# Patient Record
Sex: FEMALE | Race: WHITE | NOT HISPANIC OR LATINO | Employment: UNEMPLOYED | URBAN - METROPOLITAN AREA
[De-identification: names, ages, dates, MRNs, and addresses within clinical notes are randomized per-mention and may not be internally consistent; named-entity substitution may affect disease eponyms.]

---

## 2017-04-28 ENCOUNTER — GENERIC CONVERSION - ENCOUNTER (OUTPATIENT)
Dept: OTHER | Facility: OTHER | Age: 47
End: 2017-04-28

## 2017-05-31 ENCOUNTER — GENERIC CONVERSION - ENCOUNTER (OUTPATIENT)
Dept: OTHER | Facility: OTHER | Age: 47
End: 2017-05-31

## 2017-08-17 ENCOUNTER — ALLSCRIPTS OFFICE VISIT (OUTPATIENT)
Dept: OTHER | Facility: OTHER | Age: 47
End: 2017-08-17

## 2017-08-21 ENCOUNTER — GENERIC CONVERSION - ENCOUNTER (OUTPATIENT)
Dept: OTHER | Facility: OTHER | Age: 47
End: 2017-08-21

## 2017-10-04 ENCOUNTER — GENERIC CONVERSION - ENCOUNTER (OUTPATIENT)
Dept: OTHER | Facility: OTHER | Age: 47
End: 2017-10-04

## 2017-11-01 ENCOUNTER — GENERIC CONVERSION - ENCOUNTER (OUTPATIENT)
Dept: OTHER | Facility: OTHER | Age: 47
End: 2017-11-01

## 2017-11-29 ENCOUNTER — GENERIC CONVERSION - ENCOUNTER (OUTPATIENT)
Dept: OTHER | Facility: OTHER | Age: 47
End: 2017-11-29

## 2017-12-13 ENCOUNTER — ALLSCRIPTS OFFICE VISIT (OUTPATIENT)
Dept: OTHER | Facility: OTHER | Age: 47
End: 2017-12-13

## 2017-12-13 DIAGNOSIS — Z13.0 ENCOUNTER FOR SCREENING FOR DISEASES OF THE BLOOD AND BLOOD-FORMING ORGANS AND CERTAIN DISORDERS INVOLVING THE IMMUNE MECHANISM: ICD-10-CM

## 2017-12-13 DIAGNOSIS — Z13.220 ENCOUNTER FOR SCREENING FOR LIPOID DISORDERS: ICD-10-CM

## 2017-12-13 DIAGNOSIS — E55.9 VITAMIN D DEFICIENCY: ICD-10-CM

## 2017-12-13 DIAGNOSIS — Z13.1 ENCOUNTER FOR SCREENING FOR DIABETES MELLITUS: ICD-10-CM

## 2017-12-13 DIAGNOSIS — Z13.29 ENCOUNTER FOR SCREENING FOR OTHER SUSPECTED ENDOCRINE DISORDER: ICD-10-CM

## 2017-12-27 ENCOUNTER — GENERIC CONVERSION - ENCOUNTER (OUTPATIENT)
Dept: FAMILY MEDICINE CLINIC | Facility: CLINIC | Age: 47
End: 2017-12-27

## 2018-01-13 VITALS
BODY MASS INDEX: 22.04 KG/M2 | DIASTOLIC BLOOD PRESSURE: 68 MMHG | TEMPERATURE: 98 F | RESPIRATION RATE: 16 BRPM | WEIGHT: 137.13 LBS | HEIGHT: 66 IN | SYSTOLIC BLOOD PRESSURE: 110 MMHG | HEART RATE: 64 BPM

## 2018-01-17 NOTE — PROGRESS NOTES
Assessment    1  Encounter for preventive health examination (V70 0) (Z00 00)    Plan  Health Maintenance    · Stop: Fluzone Quadrivalent 0 5 ML Intramuscular Suspension  PMH: Acute frontal sinusitis    · Zithromax Z-Norman 250 MG Oral Tablet (Azithromycin)  PMH: Encounter for vitamin deficiency screening, Fatigue, PMH: Screening for diabetes  mellitus (DM), PMH: Screening for iron deficiency anemia, PMH: Screening for lipoid  disorders, PMH: Screening for thyroid disorder    · (1) CBC/PLT/DIFF; Status:Active; Requested for:[id number];    · (1) COMPREHENSIVE METABOLIC PANEL; Status:Active; Requested for:[id number];    · (1) LIPID PANEL, FASTING; Status:Active; Requested for:52Zra5406;    · (1) TSH; Status:Active; Requested for:88Dvp8000;    · (1) VITAMIN D 25-HYDROXY; Status:Active; Requested for:72Dsx5298;   PMH: Shoulder pain, right    · DiazePAM 5 MG Oral Tablet (Valium); take 1 tablet daily prn   · Zolpidem Tartrate 10 MG Oral Tablet (Ambien); TAKE 1 TABLET EVERY DAY AS  NEEDED    Discussion/Summary  healthy adult female Currently, she eats a healthy diet and has an adequate exercise regimen  cervical cancer screening is current Breast cancer screening: mammogram is needed every year  Colorectal cancer screening: colorectal cancer screening is not indicated  Chief Complaint  Pt here for RTN, due for blood work, pap was done in 6/16, depression screening don't, declines flu, med contract signed      History of Present Illness  HM, Adult Female: The patient is being seen for a health maintenance evaluation  The last health maintenance visit was 1 year(s) ago  Social History: Household members include spouse  She is   Work status: working full time  The patient has never smoked cigarettes  She reports never drinking alcohol  She has never used illicit drugs  General Health:   Reproductive health: the patient is premenopausal    Screening: cancer screening reviewed and updated     metabolic screening reviewed and updated  risk screening reviewed and updated  Review of Systems    Constitutional: as noted in HPI  Eyes: No complaints of eye pain, no red eyes, no eyesight problems, no discharge, no dry eyes, no itching of eyes  ENT: no complaints of earache, no loss of hearing, no nose bleeds, no nasal discharge, no sore throat, no hoarseness  Cardiovascular: No complaints of slow heart rate, no fast heart rate, no chest pain, no palpitations, no leg claudication, no lower extremity edema  Respiratory: No complaints of shortness of breath, no wheezing, no cough, no SOB on exertion, no orthopnea, no PND  Gastrointestinal: No complaints of abdominal pain, no constipation, no nausea or vomiting, no diarrhea, no bloody stools  Genitourinary: No complaints of dysuria, no incontinence, no pelvic pain, no dysmenorrhea, no vaginal discharge or bleeding  Musculoskeletal: No complaints of arthralgias, no myalgias, no joint swelling or stiffness, no limb pain or swelling  Integumentary: No complaints of skin rash or lesions, no itching, no skin wounds, no breast pain or lump  Neurological: No complaints of headache, no confusion, no convulsions, no numbness, no dizziness or fainting, no tingling, no limb weakness, no difficulty walking  Psychiatric: Not suicidal, no sleep disturbance, no anxiety or depression, no change in personality, no emotional problems  Endocrine: No complaints of proptosis, no hot flashes, no muscle weakness, no deepening of the voice, no feelings of weakness  ROS reviewed  Active Problems    1  Fatigue (653 05) (R56 13)    Surgical History    · History of Breast Surgery   · History of Umbilical Hernia Repair    Family History  Mother    · No pertinent family history  Father    · No pertinent family history    Social History    ·    · Never a smoker   · No alcohol use    Current Meds   1  Cranberry 500 MG Oral Capsule; take 1 capsule daily;    Therapy: [id number] to Recorded   2  DiazePAM 5 MG Oral Tablet; take 1 tablet daily prn; Therapy: 14JAX4931 to (Last Rx:25Nov2015) Ordered   3  Fish Oil 1000 MG Oral Capsule; TAKE 1 CAPSULE DAILY; Therapy: 46GXT6518 to (Evaluate:28Fom4098) Recorded   4  Maxalt 10 MG Oral Tablet; TAKE 1 TABLET Every twelve hours PRN; Therapy: 49KFI7008 to Recorded   5  Multiple Vitamins Oral Tablet; TAKE 1 TABLET DAILY; Therapy: 71ALK2566 to Recorded   6  Naproxen Sodium 550 MG Oral Tablet; TAKE 1 TABLET EVERY 12 HOURS AS   NEEDED; Therapy: 21XFT6285 to (Ankit Sheriff)  Requested for: 38REM8546; Last   Rx:25Nov2015 Ordered   7  Zithromax Z-Norman 250 MG Oral Tablet; TAKE 2 TABLETS ON DAY 1 THEN TAKE 1   TABLET A DAY FOR 4 DAYS; Therapy: 22FIO7372 to (Ankit Sheriff)  Requested for: 35Tsp7010; Last   Rx:72Ltk2023 Ordered   8  Zolpidem Tartrate 10 MG Oral Tablet; TAKE 1 TABLET EVERY DAY AS NEEDED; Therapy: 38IVZ4630 to (Evaluate:06Zic7629)  Requested for: 22Nov2016; Last   Rx:22Nov2016 Ordered    Allergies    1  No Known Drug Allergies    Vitals   Recorded: 47TSI6608 08:26AM   Temperature 98 1 F   Heart Rate 80   Respiration 14   Systolic 399   Diastolic 66   Height 5 ft 5 5 in   Weight 130 lb 2 08 oz   BMI Calculated 21 33   BSA Calculated 1 65     Physical Exam    Constitutional   General appearance: No acute distress, well appearing and well nourished  Eyes   Conjunctiva and lids: No swelling, erythema or discharge  Pupils and irises: Equal, round, reactive to light  Ophthalmoscopic examination: Normal fundi and optic discs  Ears, Nose, Mouth, and Throat   External inspection of ears and nose: Normal     Otoscopic examination: Tympanic membranes translucent with normal light reflex  Canals patent without erythema  Hearing: Normal     Nasal mucosa, septum, and turbinates: Normal without edema or erythema  Lips, teeth, and gums: Normal, good dentition      Oropharynx: Normal with no erythema, edema, exudate or lesions  Neck   Neck: Supple, symmetric, trachea midline, no masses  Thyroid: Normal, no thyromegaly  Pulmonary   Respiratory effort: No increased work of breathing or signs of respiratory distress  Auscultation of lungs: Clear to auscultation  Cardiovascular   Auscultation of heart: Normal rate and rhythm, normal S1 and S2, no murmurs  Carotid pulses: 2+ bilaterally  Examination of extremities for edema and/or varicosities: Normal     Skin   Skin and subcutaneous tissue: Normal without rashes or lesions  Palpation of skin and subcutaneous tissue: Normal turgor  Psychiatric   Judgment and insight: Normal     Orientation to person, place, and time: Normal     Recent and remote memory: Intact  Mood and affect: Normal        Results/Data  PHQ-9 Adult Depression Screening [id number] 10:07AM User, [employer]     Test Name Result Flag Reference   PHQ-9 Adult Depression Score 2     Over the last two weeks, how often have you been bothered by any of the following problems? Little interest or pleasure in doing things: Not at all - 0  Feeling down, depressed, or hopeless: Not at all - 0  Trouble falling or staying asleep, or sleeping too much: More than half the days - 2  Feeling tired or having little energy: Not at all - 0  Poor appetite or over eating: Not at all - 0  Feeling bad about yourself - or that you are a failure or have let yourself or your family down: Not at all - 0  Trouble concentrating on things, such as reading the newspaper or watching television: Not at all - 0  Moving or speaking so slowly that other people could have noticed   Or the opposite -  being so fidgety or restless that you have been moving around a lot more than usual: Not at all - 0  Thoughts that you would be better off dead, or of hurting yourself in some way: Not at all - 0   PHQ-9 Adult Depression Screening Negative     PHQ-9 Difficulty Level Not difficult at all     PHQ-9 Severity Minimal Depression Signatures   Electronically signed by : FER Carrillo; Dec 12 2016 10:10AM EST                       (Author)    Electronically signed by : KOFI Romero ; Dec 12 2016 12:12PM EST                       (Author)

## 2018-01-22 ENCOUNTER — GENERIC CONVERSION - ENCOUNTER (OUTPATIENT)
Dept: OTHER | Facility: OTHER | Age: 48
End: 2018-01-22

## 2018-01-23 VITALS
RESPIRATION RATE: 16 BRPM | WEIGHT: 131 LBS | HEIGHT: 66 IN | DIASTOLIC BLOOD PRESSURE: 70 MMHG | SYSTOLIC BLOOD PRESSURE: 102 MMHG | BODY MASS INDEX: 21.05 KG/M2 | HEART RATE: 72 BPM | TEMPERATURE: 98.8 F

## 2018-04-10 DIAGNOSIS — G47.9 SLEEP DISTURBANCE: Primary | ICD-10-CM

## 2018-04-10 RX ORDER — ZOLPIDEM TARTRATE 10 MG/1
1 TABLET ORAL DAILY PRN
COMMUNITY
Start: 2015-11-25 | End: 2018-04-10 | Stop reason: SDUPTHER

## 2018-04-10 RX ORDER — ZOLPIDEM TARTRATE 10 MG/1
10 TABLET ORAL
Qty: 90 TABLET | Refills: 0 | Status: SHIPPED | OUTPATIENT
Start: 2018-04-10 | End: 2019-01-09 | Stop reason: SDUPTHER

## 2018-12-19 ENCOUNTER — OFFICE VISIT (OUTPATIENT)
Dept: FAMILY MEDICINE CLINIC | Facility: CLINIC | Age: 48
End: 2018-12-19
Payer: COMMERCIAL

## 2018-12-19 ENCOUNTER — TELEPHONE (OUTPATIENT)
Dept: FAMILY MEDICINE CLINIC | Facility: CLINIC | Age: 48
End: 2018-12-19

## 2018-12-19 VITALS
DIASTOLIC BLOOD PRESSURE: 68 MMHG | WEIGHT: 126.8 LBS | HEART RATE: 74 BPM | OXYGEN SATURATION: 92 % | SYSTOLIC BLOOD PRESSURE: 100 MMHG | BODY MASS INDEX: 21.65 KG/M2 | HEIGHT: 64 IN | RESPIRATION RATE: 18 BRPM | TEMPERATURE: 98.8 F

## 2018-12-19 DIAGNOSIS — Z00.00 WELL ADULT EXAM: Primary | ICD-10-CM

## 2018-12-19 DIAGNOSIS — Z13.1 SCREENING FOR DIABETES MELLITUS: ICD-10-CM

## 2018-12-19 DIAGNOSIS — Z28.21 INFLUENZA VACCINATION DECLINED: ICD-10-CM

## 2018-12-19 DIAGNOSIS — E55.9 VITAMIN D DEFICIENCY: ICD-10-CM

## 2018-12-19 DIAGNOSIS — Z13.29 SCREENING FOR THYROID DISORDER: ICD-10-CM

## 2018-12-19 DIAGNOSIS — F41.9 ANXIETY: ICD-10-CM

## 2018-12-19 DIAGNOSIS — Z13.220 SCREENING FOR HYPERLIPIDEMIA: ICD-10-CM

## 2018-12-19 DIAGNOSIS — R53.83 FATIGUE, UNSPECIFIED TYPE: ICD-10-CM

## 2018-12-19 DIAGNOSIS — Z78.0 MENOPAUSE: ICD-10-CM

## 2018-12-19 DIAGNOSIS — Z13.0 SCREENING FOR DEFICIENCY ANEMIA: ICD-10-CM

## 2018-12-19 PROBLEM — M79.672 PAIN IN BOTH FEET: Status: ACTIVE | Noted: 2017-12-13

## 2018-12-19 PROBLEM — M79.671 PAIN IN BOTH FEET: Status: ACTIVE | Noted: 2017-12-13

## 2018-12-19 PROBLEM — K59.09 CHRONIC CONSTIPATION: Status: ACTIVE | Noted: 2017-08-17

## 2018-12-19 PROBLEM — G43.909 MIGRAINE: Status: ACTIVE | Noted: 2017-12-13

## 2018-12-19 PROBLEM — G47.9 SLEEP DISTURBANCES: Status: ACTIVE | Noted: 2017-12-13

## 2018-12-19 PROCEDURE — 99214 OFFICE O/P EST MOD 30 MIN: CPT | Performed by: NURSE PRACTITIONER

## 2018-12-19 PROCEDURE — 3008F BODY MASS INDEX DOCD: CPT | Performed by: NURSE PRACTITIONER

## 2018-12-19 PROCEDURE — 1036F TOBACCO NON-USER: CPT | Performed by: NURSE PRACTITIONER

## 2018-12-19 RX ORDER — IBUPROFEN 800 MG/1
TABLET ORAL
Refills: 0 | COMMUNITY
Start: 2018-10-04 | End: 2019-01-25

## 2018-12-19 RX ORDER — RIZATRIPTAN BENZOATE 10 MG/1
1 TABLET ORAL EVERY 12 HOURS PRN
Status: ON HOLD | COMMUNITY
Start: 2015-11-25 | End: 2019-04-10

## 2018-12-19 RX ORDER — FLUTICASONE PROPIONATE 50 MCG
2 SPRAY, SUSPENSION (ML) NASAL DAILY
COMMUNITY
Start: 2017-08-17

## 2018-12-19 RX ORDER — DIAZEPAM 5 MG/1
1 TABLET ORAL DAILY PRN
COMMUNITY
Start: 2015-11-25 | End: 2019-01-09 | Stop reason: SDUPTHER

## 2018-12-19 RX ORDER — CYCLOBENZAPRINE HCL 10 MG
1 TABLET ORAL 3 TIMES DAILY PRN
COMMUNITY
Start: 2015-11-25 | End: 2019-01-25

## 2018-12-19 RX ORDER — PYRIDOXINE HCL (VITAMIN B6) 100 MG
1 TABLET ORAL DAILY
COMMUNITY
Start: 2015-11-25 | End: 2019-01-25

## 2018-12-19 NOTE — TELEPHONE ENCOUNTER
Forgot to talk to you about her feet- she complains of bilateral foot pain and stiffness - she belives its arithitis and states her sister has it and is treated with prednisone

## 2018-12-19 NOTE — PROGRESS NOTES
Assessment/Plan:    No problem-specific Assessment & Plan notes found for this encounter  Diagnoses and all orders for this visit:    Well adult exam  Comments:  completed today    Anxiety  Comments:  Pt uses Valium as needed for anxiety    Menopause  Comments:  Labwork ordered  Orders:  -     Testosterone; Future  -     FSH and LH; Future  -     Estrogens, total; Future    Screening for deficiency anemia  Comments:  Labwork ordered  Orders:  -     CBC and differential; Future    Screening for diabetes mellitus  Comments:  Labwork ordered  Orders:  -     Comprehensive metabolic panel; Future    Screening for hyperlipidemia  Comments:  Labwork ordered  Orders:  -     Lipid panel; Future    Screening for thyroid disorder  Comments:  Labwork ordered  Orders:  -     TSH, 3rd generation with Free T4 reflex; Future    Vitamin D deficiency  Comments:  Labwork ordered  Orders:  -     Vitamin D 25 hydroxy; Future    Fatigue, unspecified type  Comments:  Labwork ordered  Orders:  -     UA (URINE) with reflex to Microscopic    Other orders  -     lubiprostone (AMITIZA) 24 mcg capsule; Take 1 capsule by mouth 2 (two) times a day  -     Cranberry 500 MG CAPS; Take 1 capsule by mouth daily  -     cyclobenzaprine (FLEXERIL) 10 mg tablet; Take 1 tablet by mouth 3 (three) times a day as needed  -     diazepam (VALIUM) 5 mg tablet; Take 1 tablet by mouth daily as needed  -     fluticasone (FLONASE) 50 mcg/act nasal spray; 2 sprays into each nostril daily  -     ibuprofen (MOTRIN) 800 mg tablet; TAKE ONE TABLET BY MOUTH EVERY 6 TO 8 HOURS AS NEEED FOR PAIN  -     rizatriptan (MAXALT) 10 MG tablet; Take 1 tablet by mouth every 12 (twelve) hours as needed  -     MULTIPLE VITAMIN PO; Take 1 tablet by mouth daily          Subjective:      Patient ID: Helen Martin is a 46 y o  female  46 yr old female here for annual physical  Pts only complaint is that she is wondering if she is menopause    Pt reports her family is planning to move to [place]        The following portions of the patient's history were reviewed and updated as appropriate:   She  has a past medical history of Anxiety; Insomnia; and Migraines  She   Patient Active Problem List    Diagnosis Date Noted    Anxiety 12/13/2017    Migraine 12/13/2017    Pain in both feet 12/13/2017    Sleep disturbances 12/13/2017    Vitamin D deficiency 12/13/2017    Chronic constipation 08/17/2017    Acute frontal sinusitis 02/29/2016    Fatigue 11/25/2015     She  has a past surgical history that includes Breast surgery; Shoulder surgery (Right); Hernia repair; and Shoulder surgery  Her family history includes Substance Abuse in her family  She  reports that she has quit smoking  She has never used smokeless tobacco  She reports that she does not drink alcohol  Her drug history is not on file  Current Outpatient Prescriptions   Medication Sig Dispense Refill    Cranberry 500 MG CAPS Take 1 capsule by mouth daily      cyclobenzaprine (FLEXERIL) 10 mg tablet Take 1 tablet by mouth 3 (three) times a day as needed      diazepam (VALIUM) 5 mg tablet Take 1 tablet by mouth daily as needed      fluticasone (FLONASE) 50 mcg/act nasal spray 2 sprays into each nostril daily      rizatriptan (MAXALT) 10 MG tablet Take 1 tablet by mouth every 12 (twelve) hours as needed      zolpidem (AMBIEN) 10 mg tablet Take 1 tablet (10 mg total) by mouth daily at bedtime as needed (as needed) 90 tablet 0    ibuprofen (MOTRIN) 800 mg tablet TAKE ONE TABLET BY MOUTH EVERY 6 TO 8 HOURS AS NEEED FOR PAIN  0    lubiprostone (AMITIZA) 24 mcg capsule Take 1 capsule by mouth 2 (two) times a day      MULTIPLE VITAMIN PO Take 1 tablet by mouth daily       No current facility-administered medications for this visit        Current Outpatient Prescriptions on File Prior to Visit   Medication Sig    zolpidem (AMBIEN) 10 mg tablet Take 1 tablet (10 mg total) by mouth daily at bedtime as needed (as needed)     No current facility-administered medications on file prior to visit  She has No Known Allergies       Review of Systems   Constitutional: Negative for fatigue  HENT: Negative for congestion, postnasal drip, rhinorrhea, sinus pain, sore throat and trouble swallowing  Eyes: Negative for pain and visual disturbance  Respiratory: Negative for cough, shortness of breath and wheezing  Cardiovascular: Negative for chest pain and palpitations  Gastrointestinal: Negative for constipation, diarrhea, nausea and vomiting  Endocrine: Negative for polydipsia, polyphagia and polyuria  Genitourinary: Negative for difficulty urinating, flank pain, frequency and pelvic pain  Musculoskeletal: Negative for arthralgias, back pain, joint swelling and myalgias  Skin: Negative  Negative for color change and rash  Neurological: Negative for dizziness, syncope, weakness, light-headedness, numbness and headaches  Hematological: Negative for adenopathy  Does not bruise/bleed easily  Psychiatric/Behavioral: Negative for behavioral problems and sleep disturbance  The patient is not nervous/anxious  Objective:      /68   Pulse 74   Temp 98 8 °F (37 1 °C) (Tympanic)   Resp 18   Ht 5' 3 5" (1 613 m)   Wt 57 5 kg (126 lb 12 8 oz)   SpO2 92%   BMI 22 11 kg/m²          Physical Exam   Constitutional: She is oriented to person, place, and time  She appears well-developed and well-nourished  HENT:   Head: Normocephalic  Eyes: Pupils are equal, round, and reactive to light  Neck: Normal range of motion  Cardiovascular: Normal rate and regular rhythm  Pulmonary/Chest: Effort normal and breath sounds normal    Abdominal: Soft  Bowel sounds are normal    Musculoskeletal: Normal range of motion  Neurological: She is alert and oriented to person, place, and time  Skin: Skin is warm and dry  Psychiatric: She has a normal mood and affect   Her behavior is normal  Judgment and thought content normal    Nursing note and vitals reviewed

## 2018-12-20 ENCOUNTER — TELEPHONE (OUTPATIENT)
Dept: FAMILY MEDICINE CLINIC | Facility: CLINIC | Age: 48
End: 2018-12-20

## 2018-12-20 NOTE — TELEPHONE ENCOUNTER
Kate Davila called and wanted to know is you spoke with the doctor about what she spoke to you about?     That is all she would tell me    [phone]

## 2018-12-21 ENCOUNTER — TELEPHONE (OUTPATIENT)
Dept: FAMILY MEDICINE CLINIC | Facility: CLINIC | Age: 48
End: 2018-12-21

## 2018-12-24 DIAGNOSIS — M79.673 PAIN OF FOOT, UNSPECIFIED LATERALITY: Primary | ICD-10-CM

## 2019-01-09 ENCOUNTER — OFFICE VISIT (OUTPATIENT)
Dept: FAMILY MEDICINE CLINIC | Facility: CLINIC | Age: 49
End: 2019-01-09
Payer: COMMERCIAL

## 2019-01-09 VITALS
TEMPERATURE: 98.9 F | HEART RATE: 72 BPM | HEIGHT: 64 IN | DIASTOLIC BLOOD PRESSURE: 76 MMHG | SYSTOLIC BLOOD PRESSURE: 104 MMHG | WEIGHT: 123.2 LBS | BODY MASS INDEX: 21.03 KG/M2

## 2019-01-09 DIAGNOSIS — M79.671 PAIN IN BOTH FEET: ICD-10-CM

## 2019-01-09 DIAGNOSIS — F41.9 ANXIETY: ICD-10-CM

## 2019-01-09 DIAGNOSIS — G47.9 SLEEP DISTURBANCE: ICD-10-CM

## 2019-01-09 DIAGNOSIS — R79.89 ELEVATED LIVER FUNCTION TESTS: Primary | ICD-10-CM

## 2019-01-09 DIAGNOSIS — M79.672 PAIN IN BOTH FEET: ICD-10-CM

## 2019-01-09 PROBLEM — S43.439A GLENOID LABRUM TEAR: Status: ACTIVE | Noted: 2017-05-31

## 2019-01-09 PROBLEM — M25.519 SHOULDER PAIN: Status: ACTIVE | Noted: 2019-01-09

## 2019-01-09 PROCEDURE — 99214 OFFICE O/P EST MOD 30 MIN: CPT | Performed by: NURSE PRACTITIONER

## 2019-01-09 RX ORDER — ZOLPIDEM TARTRATE 10 MG/1
10 TABLET ORAL
Qty: 90 TABLET | Refills: 0 | Status: SHIPPED | OUTPATIENT
Start: 2019-01-09 | End: 2019-01-15 | Stop reason: SDUPTHER

## 2019-01-09 RX ORDER — DIAZEPAM 5 MG/1
5 TABLET ORAL DAILY PRN
Qty: 30 TABLET | Refills: 1 | Status: SHIPPED | OUTPATIENT
Start: 2019-01-09 | End: 2019-07-26 | Stop reason: SDUPTHER

## 2019-01-09 NOTE — PROGRESS NOTES
Assessment/Plan:    No problem-specific Assessment & Plan notes found for this encounter  Diagnoses and all orders for this visit:    Elevated liver function tests  Comments:  US liver alk phos elevated  Orders:  -     US liver; Future    Pain in both feet  Comments:  Resolving with shoe inserts and Voltaren    Sleep disturbance  Comments:  Ambien reordered  Orders:  -     zolpidem (AMBIEN) 10 mg tablet; Take 1 tablet (10 mg total) by mouth daily at bedtime as needed (as needed)    Anxiety  Comments:  Valium reordered  Orders:  -     diazepam (VALIUM) 5 mg tablet; Take 1 tablet (5 mg total) by mouth daily as needed for anxiety          Subjective:      Patient ID: Muna Story is a 46 y o  female  46 yr old female here for lab review and to discuss pain in both feet, pt states she went to the podiatrist and was advised to use shoe inserts which she is and the Voltaren and stretches  We reviewed pt 's labs and found pt to be in Menopause  Pt would like to keep the Mirena and repeat the labs next year and then she will have it renewed  We discussed using Relizen botanical or SSRI to aid in reducing the hot flashes  The following portions of the patient's history were reviewed and updated as appropriate:   She  has a past medical history of Anxiety; Insomnia; and Migraines  She   Patient Active Problem List    Diagnosis Date Noted    Shoulder pain 01/09/2019    Influenza vaccination declined 12/19/2018    Anxiety 12/13/2017    Migraine 12/13/2017    Pain in both feet 12/13/2017    Sleep disturbances 12/13/2017    Vitamin D deficiency 12/13/2017    Chronic constipation 08/17/2017    Glenoid labrum tear 05/31/2017    Acute frontal sinusitis 02/29/2016    Fatigue 11/25/2015     She  has a past surgical history that includes Breast surgery; Shoulder surgery (Right); Hernia repair; and Shoulder surgery  Her family history includes Substance Abuse in her family    She  reports that she has quit smoking  She has never used smokeless tobacco  She reports that she does not drink alcohol  Her drug history is not on file  Current Outpatient Prescriptions   Medication Sig Dispense Refill    Cranberry 500 MG CAPS Take 1 capsule by mouth daily      diazepam (VALIUM) 5 mg tablet Take 1 tablet (5 mg total) by mouth daily as needed for anxiety 30 tablet 1    diclofenac sodium (VOLTAREN) 50 mg EC tablet Take 1 tablet (50 mg total) by mouth 2 (two) times a day 30 tablet 1    fluticasone (FLONASE) 50 mcg/act nasal spray 2 sprays into each nostril daily      MULTIPLE VITAMIN PO Take 1 tablet by mouth daily      rizatriptan (MAXALT) 10 MG tablet Take 1 tablet by mouth every 12 (twelve) hours as needed      zolpidem (AMBIEN) 10 mg tablet Take 1 tablet (10 mg total) by mouth daily at bedtime as needed (as needed) 90 tablet 0    cyclobenzaprine (FLEXERIL) 10 mg tablet Take 1 tablet by mouth 3 (three) times a day as needed      ibuprofen (MOTRIN) 800 mg tablet TAKE ONE TABLET BY MOUTH EVERY 6 TO 8 HOURS AS NEEED FOR PAIN  0    lubiprostone (AMITIZA) 24 mcg capsule Take 1 capsule by mouth 2 (two) times a day       No current facility-administered medications for this visit        Current Outpatient Prescriptions on File Prior to Visit   Medication Sig    Cranberry 500 MG CAPS Take 1 capsule by mouth daily    diclofenac sodium (VOLTAREN) 50 mg EC tablet Take 1 tablet (50 mg total) by mouth 2 (two) times a day    fluticasone (FLONASE) 50 mcg/act nasal spray 2 sprays into each nostril daily    MULTIPLE VITAMIN PO Take 1 tablet by mouth daily    rizatriptan (MAXALT) 10 MG tablet Take 1 tablet by mouth every 12 (twelve) hours as needed    [DISCONTINUED] diazepam (VALIUM) 5 mg tablet Take 1 tablet by mouth daily as needed    [DISCONTINUED] zolpidem (AMBIEN) 10 mg tablet Take 1 tablet (10 mg total) by mouth daily at bedtime as needed (as needed)    cyclobenzaprine (FLEXERIL) 10 mg tablet Take 1 tablet by mouth 3 (three) times a day as needed    ibuprofen (MOTRIN) 800 mg tablet TAKE ONE TABLET BY MOUTH EVERY 6 TO 8 HOURS AS NEEED FOR PAIN    lubiprostone (AMITIZA) 24 mcg capsule Take 1 capsule by mouth 2 (two) times a day     No current facility-administered medications on file prior to visit  She has No Known Allergies       Review of Systems   Constitutional: Negative for fatigue  HENT: Negative for congestion, postnasal drip, rhinorrhea, sinus pain, sore throat and trouble swallowing  Eyes: Negative for pain and visual disturbance  Respiratory: Negative for cough, shortness of breath and wheezing  Cardiovascular: Negative for chest pain and palpitations  Gastrointestinal: Negative for constipation, diarrhea, nausea and vomiting  Endocrine: Positive for heat intolerance  Negative for polydipsia, polyphagia and polyuria  Hot flashes at the pm   Genitourinary: Negative for difficulty urinating, flank pain, frequency and pelvic pain  Musculoskeletal: Negative for arthralgias, back pain, joint swelling and myalgias  Skin: Negative  Negative for color change and rash  Neurological: Negative for dizziness, syncope, weakness, light-headedness, numbness and headaches  Hematological: Negative for adenopathy  Does not bruise/bleed easily  Psychiatric/Behavioral: Negative for behavioral problems and sleep disturbance  The patient is not nervous/anxious  Objective:      /76   Pulse 72   Temp 98 9 °F (37 2 °C) (Tympanic)   Ht 5' 3 5" (1 613 m)   Wt 55 9 kg (123 lb 3 2 oz)   BMI 21 48 kg/m²          Physical Exam   Constitutional: She is oriented to person, place, and time  She appears well-developed and well-nourished  HENT:   Head: Normocephalic  Eyes: Pupils are equal, round, and reactive to light  Neck: Normal range of motion  Cardiovascular: Normal rate and regular rhythm  Pulmonary/Chest: Effort normal and breath sounds normal    Abdominal: Soft   Bowel sounds are normal    Musculoskeletal: Normal range of motion  Neurological: She is alert and oriented to person, place, and time  Skin: Skin is warm and dry  Psychiatric: She has a normal mood and affect  Her behavior is normal  Judgment and thought content normal    Nursing note and vitals reviewed

## 2019-01-11 ENCOUNTER — HOSPITAL ENCOUNTER (OUTPATIENT)
Dept: ULTRASOUND IMAGING | Facility: HOSPITAL | Age: 49
Discharge: HOME | End: 2019-01-11
Payer: COMMERCIAL

## 2019-01-11 ENCOUNTER — TELEPHONE (OUTPATIENT)
Dept: FAMILY MEDICINE CLINIC | Facility: CLINIC | Age: 49
End: 2019-01-11

## 2019-01-11 DIAGNOSIS — R79.89 ELEVATED LIVER FUNCTION TESTS: ICD-10-CM

## 2019-01-11 PROCEDURE — 76705 ECHO EXAM OF ABDOMEN: CPT

## 2019-01-11 NOTE — TELEPHONE ENCOUNTER
Do you know anything about this?   I dont see we sent in for brand it looks like generic was sent in

## 2019-01-11 NOTE — TELEPHONE ENCOUNTER
Looks like I did send generic can you find out call her mail in and find out what this is about? Maybe new year deductible?

## 2019-01-11 NOTE — TELEPHONE ENCOUNTER
Pt said mail order called her, said we need to called them and tell them she needs the zolpidem  Not the brand name    Stated something about an auth that used the brand name

## 2019-01-14 NOTE — TELEPHONE ENCOUNTER
Called and spoke with pharmacy Plan limits exceeded ins only will pay for #60 for 90 days - please send in new rx for 60

## 2019-01-15 ENCOUNTER — TELEPHONE (OUTPATIENT)
Dept: FAMILY MEDICINE CLINIC | Facility: CLINIC | Age: 49
End: 2019-01-15

## 2019-01-15 DIAGNOSIS — G47.9 SLEEP DISTURBANCE: ICD-10-CM

## 2019-01-15 DIAGNOSIS — K86.2 PANCREATIC CYST: Primary | ICD-10-CM

## 2019-01-15 RX ORDER — ZOLPIDEM TARTRATE 10 MG/1
10 TABLET ORAL
Qty: 60 TABLET | Refills: 0 | Status: SHIPPED | OUTPATIENT
Start: 2019-01-15 | End: 2019-01-29 | Stop reason: SDUPTHER

## 2019-01-18 DIAGNOSIS — K86.2 PANCREAS CYST: Primary | ICD-10-CM

## 2019-01-25 ENCOUNTER — OFFICE VISIT (OUTPATIENT)
Dept: URGENT CARE | Facility: CLINIC | Age: 49
End: 2019-01-25
Payer: COMMERCIAL

## 2019-01-25 VITALS
OXYGEN SATURATION: 97 % | HEART RATE: 90 BPM | WEIGHT: 124 LBS | TEMPERATURE: 97.2 F | RESPIRATION RATE: 18 BRPM | DIASTOLIC BLOOD PRESSURE: 72 MMHG | BODY MASS INDEX: 21.17 KG/M2 | SYSTOLIC BLOOD PRESSURE: 100 MMHG | HEIGHT: 64 IN

## 2019-01-25 DIAGNOSIS — J32.9 RHINOSINUSITIS: Primary | ICD-10-CM

## 2019-01-25 DIAGNOSIS — J31.0 RHINOSINUSITIS: Primary | ICD-10-CM

## 2019-01-25 PROCEDURE — 99213 OFFICE O/P EST LOW 20 MIN: CPT | Performed by: PHYSICIAN ASSISTANT

## 2019-01-25 RX ORDER — DEXTROMETHORPHAN HYDROBROMIDE AND PROMETHAZINE HYDROCHLORIDE 15; 6.25 MG/5ML; MG/5ML
5 SYRUP ORAL 4 TIMES DAILY PRN
Qty: 118 ML | Refills: 0 | Status: ON HOLD | OUTPATIENT
Start: 2019-01-25 | End: 2019-04-10

## 2019-01-25 RX ORDER — AMOXICILLIN AND CLAVULANATE POTASSIUM 875; 125 MG/1; MG/1
1 TABLET, FILM COATED ORAL EVERY 12 HOURS SCHEDULED
Qty: 14 TABLET | Refills: 0 | Status: SHIPPED | OUTPATIENT
Start: 2019-01-25 | End: 2019-02-01

## 2019-01-25 NOTE — PATIENT INSTRUCTIONS
Sudafed otc for congestion  Continue flonase   Likely viral at this point but if symptoms persist past 1 week or fever fill RX for augmentin

## 2019-01-25 NOTE — PROGRESS NOTES
[facility] [facility]        NAME: Vanessa Rodriguez is a 46 y o  female  : 1970    MRN: [id number]  DATE: 2019  TIME: 11:15 AM    Assessment and Plan   Rhinosinusitis [J32 9]  1  Rhinosinusitis  promethazine-dextromethorphan (PHENERGAN-DM) 6 25-15 mg/5 mL oral syrup    amoxicillin-clavulanate (AUGMENTIN) 875-125 mg per tablet         Patient Instructions     Patient Instructions   Sudafed otc for congestion  Continue flonase  Likely viral at this point but if symptoms persist past 1 week or fever fill RX for augmentin      Follow up with PCP in 3-5 days  Proceed to  ER if symptoms worsen  Chief Complaint     Chief Complaint   Patient presents with    Cough     x 3 days, took dayquil and rx cough med   Chills    Nasal Congestion     x 3 days    Generalized Body Aches    Sore Throat    Earache     b/l         History of Present Illness         46year-old female complains of 4 days of cough and congestion with some sinus pressure that comes and goes  History of septoplasty but no history of sinus surgery  No fever but she has had some chills  No chest pain shortness of breath  She took over-the-counter DayQuil and a prescription cough syrup she had left over  She has some BL ear pain and sore throat           Review of Systems   Review of Systems      Current Medications       Current Outpatient Prescriptions:     diazepam (VALIUM) 5 mg tablet, Take 1 tablet (5 mg total) by mouth daily as needed for anxiety, Disp: 30 tablet, Rfl: 1    fluticasone (FLONASE) 50 mcg/act nasal spray, 2 sprays into each nostril daily, Disp: , Rfl:     MULTIPLE VITAMIN PO, Take 1 tablet by mouth daily, Disp: , Rfl:     rizatriptan (MAXALT) 10 MG tablet, Take 1 tablet by mouth every 12 (twelve) hours as needed, Disp: , Rfl:     zolpidem (AMBIEN) 10 mg tablet, Take 1 tablet (10 mg total) by mouth daily at bedtime as needed (as needed), Disp: 60 tablet, Rfl: 0    amoxicillin-clavulanate (AUGMENTIN) 875-125 mg per tablet, Take 1 tablet by mouth every 12 (twelve) hours for 7 days, Disp: 14 tablet, Rfl: 0    promethazine-dextromethorphan (PHENERGAN-DM) 6 25-15 mg/5 mL oral syrup, Take 5 mL by mouth 4 (four) times a day as needed for cough, Disp: 118 mL, Rfl: 0    Current Allergies     Allergies as of 01/25/2019    (No Known Allergies)            The following portions of the patient's history were reviewed and updated as appropriate: allergies, current medications, past family history, past medical history, past social history, past surgical history and problem list      Past Medical History:   Diagnosis Date    Anxiety     Insomnia     Migraines        Past Surgical History:   Procedure Laterality Date    BREAST SURGERY      HERNIA REPAIR      SHOULDER SURGERY Right     SHOULDER SURGERY         Family History   Problem Relation Age of Onset    Substance Abuse Family          Medications have been verified  Objective   /72 (BP Location: Left arm, Patient Position: Sitting, Cuff Size: Standard)   Pulse 90   Temp (!) 97 2 °F (36 2 °C) (Tympanic)   Resp 18   Ht 5' 4" (1 626 m)   Wt 56 2 kg (124 lb)   SpO2 97%   BMI 21 28 kg/m²        Physical Exam     Physical Exam   Constitutional: She appears well-developed and well-nourished  No distress  HENT:   Head: Normocephalic and atraumatic  Right Ear: Tympanic membrane, external ear and ear canal normal    Left Ear: Tympanic membrane, external ear and ear canal normal    Nose: Mucosal edema present  No rhinorrhea  Right sinus exhibits no maxillary sinus tenderness and no frontal sinus tenderness  Left sinus exhibits no maxillary sinus tenderness and no frontal sinus tenderness  Mouth/Throat: Oropharynx is clear and moist  No posterior oropharyngeal erythema  Eyes: Pupils are equal, round, and reactive to light  Conjunctivae and EOM are normal  No scleral icterus  Neck: Normal range of motion  Neck supple     Cardiovascular: Normal rate, regular rhythm and normal heart sounds  Pulmonary/Chest: Effort normal and breath sounds normal  No respiratory distress  She has no wheezes  She has no rales  Abdominal: Soft  Bowel sounds are normal  She exhibits no distension and no mass  There is no tenderness  There is no rebound and no guarding  Lymphadenopathy:     She has no cervical adenopathy  Skin: Skin is warm and dry  No rash noted

## 2019-01-29 DIAGNOSIS — G47.9 SLEEP DISTURBANCE: ICD-10-CM

## 2019-01-29 RX ORDER — ZOLPIDEM TARTRATE 10 MG/1
TABLET ORAL
Qty: 30 TABLET | Refills: 1 | Status: SHIPPED | OUTPATIENT
Start: 2019-01-29 | End: 2019-10-01 | Stop reason: SDUPTHER

## 2019-02-07 ENCOUNTER — HOSPITAL ENCOUNTER (OUTPATIENT)
Dept: MRI IMAGING | Facility: HOSPITAL | Age: 49
Discharge: HOME | End: 2019-02-07
Payer: COMMERCIAL

## 2019-02-07 DIAGNOSIS — K86.2 PANCREAS CYST: ICD-10-CM

## 2019-02-07 PROCEDURE — 74181 MRI ABDOMEN W/O CONTRAST: CPT

## 2019-02-07 PROCEDURE — 76377 3D RENDER W/INTRP POSTPROCES: CPT

## 2019-02-08 ENCOUNTER — TELEPHONE (OUTPATIENT)
Dept: FAMILY MEDICINE CLINIC | Facility: CLINIC | Age: 49
End: 2019-02-08

## 2019-02-08 NOTE — TELEPHONE ENCOUNTER
PT went to have MRI done yesterday  They could not get the IV started for the contrast     She was very upset  They did do wo the contrast and she was not sure if you will still need with the contrast     I explained you will need to get the results first to determine this  So she will wait to here from you      Please advise    [phone]

## 2019-02-11 NOTE — TELEPHONE ENCOUNTER
Please call pt and tell her I want her to see Gi of her choice due to need for ERCP to quantify what they are seeing on the pancreas

## 2019-02-13 ENCOUNTER — TELEPHONE (OUTPATIENT)
Dept: FAMILY MEDICINE CLINIC | Facility: CLINIC | Age: 49
End: 2019-02-13

## 2019-02-13 DIAGNOSIS — R79.89 ELEVATED LIVER FUNCTION TESTS: ICD-10-CM

## 2019-02-13 DIAGNOSIS — K86.2 PANCREAS CYST: Primary | ICD-10-CM

## 2019-02-13 NOTE — TELEPHONE ENCOUNTER
Spoke with pt this am about this and she was very upset about the findings and the fact she was not called immediately - apologized to patient and gave her 2 gasto physicians we use and she wants to look into them before we put a referral in- she was asking more questions about the findings that I was unable to answer for her so I let patient speak with Yahaira Lynch

## 2019-02-21 ENCOUNTER — OFFICE VISIT (OUTPATIENT)
Dept: GASTROENTEROLOGY | Facility: CLINIC | Age: 49
End: 2019-02-21
Payer: COMMERCIAL

## 2019-02-21 ENCOUNTER — TELEPHONE (OUTPATIENT)
Dept: GASTROENTEROLOGY | Facility: CLINIC | Age: 49
End: 2019-02-21

## 2019-02-21 VITALS
HEIGHT: 64 IN | DIASTOLIC BLOOD PRESSURE: 70 MMHG | SYSTOLIC BLOOD PRESSURE: 115 MMHG | HEART RATE: 79 BPM | BODY MASS INDEX: 21.34 KG/M2 | WEIGHT: 125 LBS

## 2019-02-21 DIAGNOSIS — K86.2 PANCREATIC CYST: Primary | ICD-10-CM

## 2019-02-21 DIAGNOSIS — R74.8 ELEVATED ALKALINE PHOSPHATASE LEVEL: ICD-10-CM

## 2019-02-21 DIAGNOSIS — K59.00 CONSTIPATION, UNSPECIFIED CONSTIPATION TYPE: ICD-10-CM

## 2019-02-21 PROCEDURE — 99204 OFFICE O/P NEW MOD 45 MIN: CPT | Performed by: NURSE PRACTITIONER

## 2019-02-21 NOTE — PROGRESS NOTES
Assessment/Plan:    Pancreatic cyst  Unenhanced MRI- focal duct ectasia vs side branch IPMN- will refer for endoscopic ultrasound  Elevated alkaline phosphatase- blood work         Diagnoses and all orders for this visit:    Pancreatic cyst  -     Alkaline phosphatase, isoenzymes; Future  -     Hepatic function panel; Future  -     Gamma GT; Future  -     Ambulatory Referral to GI Endoscopy; Future    Elevated alkaline phosphatase level  -     Alkaline phosphatase, isoenzymes; Future  -     Hepatic function panel; Future  -     Gamma GT; Future    Constipation, unspecified constipation type  -     Linaclotide (LINZESS) 145 MCG CAPS; Take 1 capsule (145 mcg total) by mouth daily Wait 1/2 hr before you eat            Subjective:      Patient ID: Muna Story is a 46 y o  female  61-year-old otherwise healthy female is here for pancreatic ductal cyst and according to MRI rule out focal duct ectasia versus IPMN and an elevated alkaline phosphatase, it was 122  This is the 1st time she has had an elevated level  She subsequently had an MRI/MRCP that needed to be unenhanced due to inability to find a vein and a pancreatic ductal cyst was found  She is here for evaluation  She denies nausea, vomiting, abdominal pain, jaundice, itchy skin  She denies diarrhea melena or hematochezia  She has had a multiyear history of intermittent constipation for which she has tried many over-the-counter remedies it  Palpating after wall  She has never had a colonoscopy and no family history of pancreas cancer, pancreatitis issues, or colon cancer  She is due for initial screening colonoscopy but would like to get the endoscopic ultrasound 1st       The following portions of the patient's history were reviewed and updated as appropriate: She  has a past medical history of Anxiety, Insomnia, and Migraines    She   Patient Active Problem List    Diagnosis Date Noted    Constipation 02/21/2019    Elevated alkaline phosphatase level 02/21/2019    Pancreatic cyst 02/21/2019    Shoulder pain 01/09/2019    Influenza vaccination declined 12/19/2018    Anxiety 12/13/2017    Migraine 12/13/2017    Pain in both feet 12/13/2017    Sleep disturbances 12/13/2017    Vitamin D deficiency 12/13/2017    Chronic constipation 08/17/2017    Glenoid labrum tear 05/31/2017    Acute frontal sinusitis 02/29/2016    Fatigue 11/25/2015     She  has a past surgical history that includes Breast surgery; Shoulder surgery (Right); Hernia repair; and Shoulder surgery  Her family history includes Substance Abuse in her family  She  reports that she has quit smoking  She has never used smokeless tobacco  She reports that she does not drink alcohol  Her drug history is not on file  Current Outpatient Medications   Medication Sig Dispense Refill    diazepam (VALIUM) 5 mg tablet Take 1 tablet (5 mg total) by mouth daily as needed for anxiety 30 tablet 1    fluticasone (FLONASE) 50 mcg/act nasal spray 2 sprays into each nostril daily      MULTIPLE VITAMIN PO Take 1 tablet by mouth daily      promethazine-dextromethorphan (PHENERGAN-DM) 6 25-15 mg/5 mL oral syrup Take 5 mL by mouth 4 (four) times a day as needed for cough 118 mL 0    rizatriptan (MAXALT) 10 MG tablet Take 1 tablet by mouth every 12 (twelve) hours as needed      zolpidem (AMBIEN) 10 mg tablet TAKE 1 TABLET BY MOUTH  DAILY AT BEDTIME AS NEEDED 30 tablet 1    Linaclotide (LINZESS) 145 MCG CAPS Take 1 capsule (145 mcg total) by mouth daily Wait 1/2 hr before you eat 16 capsule 0     No current facility-administered medications for this visit        Current Outpatient Medications on File Prior to Visit   Medication Sig    diazepam (VALIUM) 5 mg tablet Take 1 tablet (5 mg total) by mouth daily as needed for anxiety    fluticasone (FLONASE) 50 mcg/act nasal spray 2 sprays into each nostril daily    MULTIPLE VITAMIN PO Take 1 tablet by mouth daily    promethazine-dextromethorphan (PHENERGAN-DM) 6 25-15 mg/5 mL oral syrup Take 5 mL by mouth 4 (four) times a day as needed for cough    rizatriptan (MAXALT) 10 MG tablet Take 1 tablet by mouth every 12 (twelve) hours as needed    zolpidem (AMBIEN) 10 mg tablet TAKE 1 TABLET BY MOUTH  DAILY AT BEDTIME AS NEEDED     No current facility-administered medications on file prior to visit  She has No Known Allergies       Review of Systems   Constitutional: Negative for activity change, appetite change, chills, diaphoresis, fatigue and unexpected weight change  HENT: Negative for mouth sores, sore throat, trouble swallowing and voice change  Eyes: Negative for pain  Respiratory: Negative  Cardiovascular: Negative  Gastrointestinal: Positive for constipation  Endocrine: Negative  Musculoskeletal: Negative for arthralgias  Skin: Negative  Hematological: Negative for adenopathy  Does not bruise/bleed easily  Psychiatric/Behavioral: Negative  Objective:      /70   Pulse 79   Ht 5' 4" (1 626 m)   Wt 56 7 kg (125 lb)   BMI 21 46 kg/m²          Physical Exam   Constitutional: She is oriented to person, place, and time  She appears well-developed and well-nourished  Eyes: No scleral icterus  Cardiovascular: Normal rate and regular rhythm  Pulmonary/Chest: Effort normal and breath sounds normal    Abdominal: Soft  Bowel sounds are normal    Lymphadenopathy:     She has no cervical adenopathy  Neurological: She is alert and oriented to person, place, and time  Skin: Skin is warm and dry  Psychiatric: She has a normal mood and affect

## 2019-02-22 ENCOUNTER — TELEPHONE (OUTPATIENT)
Dept: GASTROENTEROLOGY | Facility: AMBULARY SURGERY CENTER | Age: 49
End: 2019-02-22

## 2019-02-22 ENCOUNTER — TELEPHONE (OUTPATIENT)
Dept: GASTROENTEROLOGY | Facility: CLINIC | Age: 49
End: 2019-02-22

## 2019-02-22 NOTE — TELEPHONE ENCOUNTER
I spoke with pt  She's aware if any sooner cancellations I will call her  Offered to schedule sooner appt with Dr Henry pt refused

## 2019-02-22 NOTE — TELEPHONE ENCOUNTER
Pt would like to speak with you about the LINEAR ENDOSCOPIC U/S  She is very upset that she can't be seen until April 10th  I informed the pt that you will not be back into the office until Tuesday   Best call back number is [phone]

## 2019-02-22 NOTE — TELEPHONE ENCOUNTER
EUS scheduled with Dr Avelar in [place] on 4/10/2019  I gave pt verbal instructions/mailed  I offered to schedule pt sooner with Dr Henry  Stated let me check his reviews & said I will schedule with Dr Avelar  Pt aware if any cancellations I will call her for a sooner date

## 2019-02-22 NOTE — TELEPHONE ENCOUNTER
Onelia Monique pt    Pt called requesting an earlier procedure date  After speaking to Karlos Clement, pt was informed she will be call if any earlier availability because the schedule is fully booked  Pt was upset

## 2019-02-26 ENCOUNTER — TELEPHONE (OUTPATIENT)
Dept: GASTROENTEROLOGY | Facility: CLINIC | Age: 49
End: 2019-02-26

## 2019-03-11 DIAGNOSIS — K59.00 CONSTIPATION, UNSPECIFIED CONSTIPATION TYPE: ICD-10-CM

## 2019-04-09 ENCOUNTER — ANESTHESIA EVENT (OUTPATIENT)
Dept: GASTROENTEROLOGY | Facility: HOSPITAL | Age: 49
End: 2019-04-09
Payer: COMMERCIAL

## 2019-04-10 ENCOUNTER — HOSPITAL ENCOUNTER (OUTPATIENT)
Facility: HOSPITAL | Age: 49
Setting detail: OBSERVATION
Discharge: HOME | End: 2019-04-12
Attending: EMERGENCY MEDICINE | Admitting: HOSPITALIST
Payer: COMMERCIAL

## 2019-04-10 ENCOUNTER — TELEPHONE (OUTPATIENT)
Dept: GASTROENTEROLOGY | Facility: AMBULARY SURGERY CENTER | Age: 49
End: 2019-04-10

## 2019-04-10 ENCOUNTER — APPOINTMENT (OUTPATIENT)
Dept: RADIOLOGY | Facility: HOSPITAL | Age: 49
End: 2019-04-10
Payer: COMMERCIAL

## 2019-04-10 ENCOUNTER — TRANSCRIBE ORDERS (OUTPATIENT)
Dept: RADIOLOGY | Facility: HOSPITAL | Age: 49
End: 2019-04-10

## 2019-04-10 ENCOUNTER — HOSPITAL ENCOUNTER (OUTPATIENT)
Facility: HOSPITAL | Age: 49
Setting detail: OUTPATIENT SURGERY
Discharge: HOME | End: 2019-04-10
Attending: INTERNAL MEDICINE | Admitting: INTERNAL MEDICINE
Payer: COMMERCIAL

## 2019-04-10 ENCOUNTER — APPOINTMENT (EMERGENCY)
Dept: RADIOLOGY | Facility: HOSPITAL | Age: 49
End: 2019-04-10
Payer: COMMERCIAL

## 2019-04-10 ENCOUNTER — ANESTHESIA (OUTPATIENT)
Dept: GASTROENTEROLOGY | Facility: HOSPITAL | Age: 49
End: 2019-04-10
Payer: COMMERCIAL

## 2019-04-10 VITALS
BODY MASS INDEX: 21.62 KG/M2 | RESPIRATION RATE: 20 BRPM | DIASTOLIC BLOOD PRESSURE: 78 MMHG | HEART RATE: 59 BPM | SYSTOLIC BLOOD PRESSURE: 117 MMHG | TEMPERATURE: 97.4 F | HEIGHT: 63 IN | WEIGHT: 122 LBS | OXYGEN SATURATION: 100 %

## 2019-04-10 DIAGNOSIS — K86.2 PANCREATIC CYST: Primary | ICD-10-CM

## 2019-04-10 DIAGNOSIS — G89.18 POST-OPERATIVE PAIN: Primary | ICD-10-CM

## 2019-04-10 LAB
ALBUMIN SERPL BCP-MCNC: 4.1 G/DL (ref 3.5–5)
ALP SERPL-CCNC: 123 U/L (ref 46–116)
ALT SERPL W P-5'-P-CCNC: 17 U/L (ref 12–78)
ANION GAP SERPL CALCULATED.3IONS-SCNC: 10 MMOL/L (ref 4–13)
AST SERPL W P-5'-P-CCNC: 15 U/L (ref 5–45)
BASOPHILS # BLD AUTO: 0.04 THOUSANDS/ΜL (ref 0–0.1)
BASOPHILS NFR BLD AUTO: 1 % (ref 0–1)
BILIRUB SERPL-MCNC: 0.6 MG/DL (ref 0.2–1)
BUN SERPL-MCNC: 9 MG/DL (ref 5–25)
CALCIUM SERPL-MCNC: 9.1 MG/DL (ref 8.3–10.1)
CHLORIDE SERPL-SCNC: 106 MMOL/L (ref 100–108)
CO2 SERPL-SCNC: 26 MMOL/L (ref 21–32)
CREAT SERPL-MCNC: 0.7 MG/DL (ref 0.6–1.3)
EOSINOPHIL # BLD AUTO: 0.02 THOUSAND/ΜL (ref 0–0.61)
EOSINOPHIL NFR BLD AUTO: 0 % (ref 0–6)
ERYTHROCYTE [DISTWIDTH] IN BLOOD BY AUTOMATED COUNT: 13.7 % (ref 11.6–15.1)
GFR SERPL CREATININE-BSD FRML MDRD: 101 ML/MIN/1.73SQ M
GLUCOSE SERPL-MCNC: 105 MG/DL (ref 65–140)
HCG SERPL QL: NEGATIVE
HCT VFR BLD AUTO: 43.1 % (ref 34.8–46.1)
HGB BLD-MCNC: 14.2 G/DL (ref 11.5–15.4)
IMM GRANULOCYTES # BLD AUTO: 0.03 THOUSAND/UL (ref 0–0.2)
IMM GRANULOCYTES NFR BLD AUTO: 0 % (ref 0–2)
LIPASE SERPL-CCNC: 173 U/L (ref 73–393)
LYMPHOCYTES # BLD AUTO: 1.24 THOUSANDS/ΜL (ref 0.6–4.47)
LYMPHOCYTES NFR BLD AUTO: 15 % (ref 14–44)
MCH RBC QN AUTO: 31 PG (ref 26.8–34.3)
MCHC RBC AUTO-ENTMCNC: 32.9 G/DL (ref 31.4–37.4)
MCV RBC AUTO: 94 FL (ref 82–98)
MONOCYTES # BLD AUTO: 0.45 THOUSAND/ΜL (ref 0.17–1.22)
MONOCYTES NFR BLD AUTO: 5 % (ref 4–12)
NEUTROPHILS # BLD AUTO: 6.77 THOUSANDS/ΜL (ref 1.85–7.62)
NEUTS SEG NFR BLD AUTO: 79 % (ref 43–75)
NRBC BLD AUTO-RTO: 0 /100 WBCS
PLATELET # BLD AUTO: 304 THOUSANDS/UL (ref 149–390)
PMV BLD AUTO: 9.5 FL (ref 8.9–12.7)
POTASSIUM SERPL-SCNC: 3.4 MMOL/L (ref 3.5–5.3)
PROT SERPL-MCNC: 7.2 G/DL (ref 6.4–8.2)
RBC # BLD AUTO: 4.58 MILLION/UL (ref 3.81–5.12)
SODIUM SERPL-SCNC: 142 MMOL/L (ref 136–145)
TROPONIN I SERPL-MCNC: <0.02 NG/ML
WBC # BLD AUTO: 8.55 THOUSAND/UL (ref 4.31–10.16)

## 2019-04-10 PROCEDURE — 74177 CT ABD & PELVIS W/CONTRAST: CPT

## 2019-04-10 PROCEDURE — 99283 EMERGENCY DEPT VISIT LOW MDM: CPT | Performed by: EMERGENCY MEDICINE

## 2019-04-10 PROCEDURE — 99285 EMERGENCY DEPT VISIT HI MDM: CPT

## 2019-04-10 PROCEDURE — 84703 CHORIONIC GONADOTROPIN ASSAY: CPT | Performed by: EMERGENCY MEDICINE

## 2019-04-10 PROCEDURE — 43238 EGD US FINE NEEDLE BX/ASPIR: CPT | Performed by: INTERNAL MEDICINE

## 2019-04-10 PROCEDURE — 85025 COMPLETE CBC W/AUTO DIFF WBC: CPT | Performed by: EMERGENCY MEDICINE

## 2019-04-10 PROCEDURE — 83690 ASSAY OF LIPASE: CPT | Performed by: EMERGENCY MEDICINE

## 2019-04-10 PROCEDURE — 80053 COMPREHEN METABOLIC PANEL: CPT | Performed by: EMERGENCY MEDICINE

## 2019-04-10 PROCEDURE — NC001 PR NO CHARGE: Performed by: INTERNAL MEDICINE

## 2019-04-10 PROCEDURE — 71046 X-RAY EXAM CHEST 2 VIEWS: CPT

## 2019-04-10 PROCEDURE — 36415 COLL VENOUS BLD VENIPUNCTURE: CPT | Performed by: EMERGENCY MEDICINE

## 2019-04-10 PROCEDURE — 93005 ELECTROCARDIOGRAM TRACING: CPT

## 2019-04-10 PROCEDURE — 84484 ASSAY OF TROPONIN QUANT: CPT | Performed by: EMERGENCY MEDICINE

## 2019-04-10 RX ORDER — LIDOCAINE HYDROCHLORIDE 10 MG/ML
INJECTION, SOLUTION INFILTRATION; PERINEURAL AS NEEDED
Status: DISCONTINUED | OUTPATIENT
Start: 2019-04-10 | End: 2019-04-10 | Stop reason: SURG

## 2019-04-10 RX ORDER — PROPOFOL 10 MG/ML
INJECTION, EMULSION INTRAVENOUS CONTINUOUS PRN
Status: DISCONTINUED | OUTPATIENT
Start: 2019-04-10 | End: 2019-04-10 | Stop reason: SURG

## 2019-04-10 RX ORDER — DIPHENHYDRAMINE HCL 25 MG
25 CAPSULE ORAL EVERY 6 HOURS PRN
COMMUNITY

## 2019-04-10 RX ORDER — SODIUM CHLORIDE 9 MG/ML
INJECTION, SOLUTION INTRAVENOUS CONTINUOUS PRN
Status: DISCONTINUED | OUTPATIENT
Start: 2019-04-10 | End: 2019-04-10 | Stop reason: SURG

## 2019-04-10 RX ORDER — FENTANYL CITRATE 50 UG/ML
25 INJECTION, SOLUTION INTRAMUSCULAR; INTRAVENOUS ONCE
Status: COMPLETED | OUTPATIENT
Start: 2019-04-10 | End: 2019-04-10

## 2019-04-10 RX ORDER — FENTANYL CITRATE 50 UG/ML
INJECTION, SOLUTION INTRAMUSCULAR; INTRAVENOUS AS NEEDED
Status: DISCONTINUED | OUTPATIENT
Start: 2019-04-10 | End: 2019-04-10 | Stop reason: SURG

## 2019-04-10 RX ORDER — SUCRALFATE ORAL 1 G/10ML
1000 SUSPENSION ORAL ONCE
Status: COMPLETED | OUTPATIENT
Start: 2019-04-10 | End: 2019-04-10

## 2019-04-10 RX ORDER — SUCRALFATE ORAL 1 G/10ML
1000 SUSPENSION ORAL ONCE
Status: DISCONTINUED | OUTPATIENT
Start: 2019-04-10 | End: 2019-04-10

## 2019-04-10 RX ORDER — OXYCODONE HYDROCHLORIDE 5 MG/1
5 TABLET ORAL ONCE
Status: COMPLETED | OUTPATIENT
Start: 2019-04-10 | End: 2019-04-10

## 2019-04-10 RX ORDER — CIPROFLOXACIN 500 MG/1
500 TABLET, FILM COATED ORAL EVERY 12 HOURS SCHEDULED
Qty: 6 TABLET | Refills: 0 | Status: SHIPPED | OUTPATIENT
Start: 2019-04-10 | End: 2019-04-10 | Stop reason: HOSPADM

## 2019-04-10 RX ORDER — CIPROFLOXACIN 500 MG/1
500 TABLET, FILM COATED ORAL EVERY 12 HOURS SCHEDULED
Qty: 6 TABLET | Refills: 0 | Status: SHIPPED | OUTPATIENT
Start: 2019-04-10 | End: 2019-04-13

## 2019-04-10 RX ORDER — MAGNESIUM HYDROXIDE/ALUMINUM HYDROXICE/SIMETHICONE 120; 1200; 1200 MG/30ML; MG/30ML; MG/30ML
30 SUSPENSION ORAL ONCE
Status: COMPLETED | OUTPATIENT
Start: 2019-04-10 | End: 2019-04-10

## 2019-04-10 RX ORDER — SODIUM CHLORIDE 9 MG/ML
100 INJECTION, SOLUTION INTRAVENOUS CONTINUOUS
Status: DISCONTINUED | OUTPATIENT
Start: 2019-04-10 | End: 2019-04-10 | Stop reason: HOSPADM

## 2019-04-10 RX ORDER — KETOROLAC TROMETHAMINE 30 MG/ML
15 INJECTION, SOLUTION INTRAMUSCULAR; INTRAVENOUS ONCE
Status: DISCONTINUED | OUTPATIENT
Start: 2019-04-10 | End: 2019-04-10

## 2019-04-10 RX ORDER — CIPROFLOXACIN 2 MG/ML
400 INJECTION, SOLUTION INTRAVENOUS ONCE
Status: CANCELLED | OUTPATIENT
Start: 2019-04-10 | End: 2019-04-10

## 2019-04-10 RX ORDER — IBUPROFEN 200 MG
TABLET ORAL EVERY 6 HOURS PRN
COMMUNITY
End: 2019-04-12 | Stop reason: HOSPADM

## 2019-04-10 RX ORDER — HYDROMORPHONE HCL/PF 1 MG/ML
0.5 SYRINGE (ML) INJECTION ONCE AS NEEDED
Status: COMPLETED | OUTPATIENT
Start: 2019-04-10 | End: 2019-04-11

## 2019-04-10 RX ORDER — ONDANSETRON 2 MG/ML
4 INJECTION INTRAMUSCULAR; INTRAVENOUS ONCE
Status: DISCONTINUED | OUTPATIENT
Start: 2019-04-10 | End: 2019-04-10

## 2019-04-10 RX ORDER — ONDANSETRON 4 MG/1
4 TABLET, ORALLY DISINTEGRATING ORAL ONCE
Status: COMPLETED | OUTPATIENT
Start: 2019-04-10 | End: 2019-04-10

## 2019-04-10 RX ORDER — PROPOFOL 10 MG/ML
INJECTION, EMULSION INTRAVENOUS AS NEEDED
Status: DISCONTINUED | OUTPATIENT
Start: 2019-04-10 | End: 2019-04-10 | Stop reason: SURG

## 2019-04-10 RX ADMIN — FENTANYL CITRATE 25 MCG: 50 INJECTION, SOLUTION INTRAMUSCULAR; INTRAVENOUS at 10:04

## 2019-04-10 RX ADMIN — ONDANSETRON 4 MG: 4 TABLET, ORALLY DISINTEGRATING ORAL at 22:10

## 2019-04-10 RX ADMIN — FENTANYL CITRATE 25 MCG: 50 INJECTION, SOLUTION INTRAMUSCULAR; INTRAVENOUS at 10:49

## 2019-04-10 RX ADMIN — ALUMINUM HYDROXIDE, MAGNESIUM HYDROXIDE, AND SIMETHICONE 30 ML: 200; 200; 20 SUSPENSION ORAL at 22:10

## 2019-04-10 RX ADMIN — OXYCODONE HYDROCHLORIDE 5 MG: 5 TABLET ORAL at 22:10

## 2019-04-10 RX ADMIN — PROPOFOL 120 MG: 10 INJECTION, EMULSION INTRAVENOUS at 10:04

## 2019-04-10 RX ADMIN — IOHEXOL 85 ML: 350 INJECTION, SOLUTION INTRAVENOUS at 14:42

## 2019-04-10 RX ADMIN — LIDOCAINE HYDROCHLORIDE 50 MG: 10 INJECTION, SOLUTION INFILTRATION; PERINEURAL at 10:04

## 2019-04-10 RX ADMIN — LIDOCAINE HYDROCHLORIDE 15 ML: 20 SOLUTION ORAL; TOPICAL at 22:10

## 2019-04-10 RX ADMIN — FENTANYL CITRATE 25 MCG: 50 INJECTION, SOLUTION INTRAMUSCULAR; INTRAVENOUS at 10:16

## 2019-04-10 RX ADMIN — PROPOFOL 100 MCG/KG/MIN: 10 INJECTION, EMULSION INTRAVENOUS at 10:04

## 2019-04-10 RX ADMIN — SUCRALFATE 1000 MG: 1 SUSPENSION ORAL at 22:10

## 2019-04-10 RX ADMIN — SODIUM CHLORIDE: 0.9 INJECTION, SOLUTION INTRAVENOUS at 09:45

## 2019-04-10 RX ADMIN — CIPROFLOXACIN 400 MG: 2 INJECTION, SOLUTION INTRAVENOUS at 10:07

## 2019-04-10 RX ADMIN — SODIUM CHLORIDE 100 ML/HR: 0.9 INJECTION, SOLUTION INTRAVENOUS at 09:00

## 2019-04-11 ENCOUNTER — TELEPHONE (OUTPATIENT)
Dept: GASTROENTEROLOGY | Facility: CLINIC | Age: 49
End: 2019-04-11

## 2019-04-11 PROBLEM — R10.9 INTRACTABLE ABDOMINAL PAIN: Status: ACTIVE | Noted: 2019-04-11

## 2019-04-11 LAB
ALBUMIN SERPL BCP-MCNC: 3.5 G/DL (ref 3.5–5)
ALP SERPL-CCNC: 111 U/L (ref 46–116)
ALT SERPL W P-5'-P-CCNC: 15 U/L (ref 12–78)
ANION GAP SERPL CALCULATED.3IONS-SCNC: 9 MMOL/L (ref 4–13)
AST SERPL W P-5'-P-CCNC: 19 U/L (ref 5–45)
ATRIAL RATE: 63 BPM
BILIRUB SERPL-MCNC: 0.8 MG/DL (ref 0.2–1)
BUN SERPL-MCNC: 9 MG/DL (ref 5–25)
CALCIUM SERPL-MCNC: 8.5 MG/DL (ref 8.3–10.1)
CHLORIDE SERPL-SCNC: 108 MMOL/L (ref 100–108)
CO2 SERPL-SCNC: 24 MMOL/L (ref 21–32)
CREAT SERPL-MCNC: 0.66 MG/DL (ref 0.6–1.3)
ERYTHROCYTE [DISTWIDTH] IN BLOOD BY AUTOMATED COUNT: 14 % (ref 11.6–15.1)
GFR SERPL CREATININE-BSD FRML MDRD: 103 ML/MIN/1.73SQ M
GLUCOSE P FAST SERPL-MCNC: 101 MG/DL (ref 65–99)
GLUCOSE SERPL-MCNC: 101 MG/DL (ref 65–140)
HCT VFR BLD AUTO: 37.3 % (ref 34.8–46.1)
HGB BLD-MCNC: 11.8 G/DL (ref 11.5–15.4)
LIPASE SERPL-CCNC: 104 U/L (ref 73–393)
MCH RBC QN AUTO: 30.9 PG (ref 26.8–34.3)
MCHC RBC AUTO-ENTMCNC: 31.6 G/DL (ref 31.4–37.4)
MCV RBC AUTO: 98 FL (ref 82–98)
P AXIS: 73 DEGREES
PLATELET # BLD AUTO: 228 THOUSANDS/UL (ref 149–390)
PMV BLD AUTO: 9.8 FL (ref 8.9–12.7)
POTASSIUM SERPL-SCNC: 3.8 MMOL/L (ref 3.5–5.3)
PR INTERVAL: 194 MS
PROT SERPL-MCNC: 6.5 G/DL (ref 6.4–8.2)
QRS AXIS: 78 DEGREES
QRSD INTERVAL: 82 MS
QT INTERVAL: 440 MS
QTC INTERVAL: 450 MS
RBC # BLD AUTO: 3.82 MILLION/UL (ref 3.81–5.12)
SODIUM SERPL-SCNC: 141 MMOL/L (ref 136–145)
T WAVE AXIS: 68 DEGREES
VENTRICULAR RATE: 63 BPM
WBC # BLD AUTO: 6.78 THOUSAND/UL (ref 4.31–10.16)

## 2019-04-11 PROCEDURE — 99219 PR INITIAL OBSERVATION CARE/DAY 50 MINUTES: CPT | Performed by: HOSPITALIST

## 2019-04-11 PROCEDURE — 96376 TX/PRO/DX INJ SAME DRUG ADON: CPT

## 2019-04-11 PROCEDURE — 36415 COLL VENOUS BLD VENIPUNCTURE: CPT | Performed by: PHYSICIAN ASSISTANT

## 2019-04-11 PROCEDURE — 80053 COMPREHEN METABOLIC PANEL: CPT | Performed by: PHYSICIAN ASSISTANT

## 2019-04-11 PROCEDURE — 99244 OFF/OP CNSLTJ NEW/EST MOD 40: CPT | Performed by: INTERNAL MEDICINE

## 2019-04-11 PROCEDURE — 83690 ASSAY OF LIPASE: CPT | Performed by: PHYSICIAN ASSISTANT

## 2019-04-11 PROCEDURE — 85027 COMPLETE CBC AUTOMATED: CPT | Performed by: PHYSICIAN ASSISTANT

## 2019-04-11 PROCEDURE — 93010 ELECTROCARDIOGRAM REPORT: CPT | Performed by: INTERNAL MEDICINE

## 2019-04-11 PROCEDURE — NC001 PR NO CHARGE: Performed by: INTERNAL MEDICINE

## 2019-04-11 PROCEDURE — 96374 THER/PROPH/DIAG INJ IV PUSH: CPT

## 2019-04-11 RX ORDER — SUCRALFATE ORAL 1 G/10ML
1000 SUSPENSION ORAL 4 TIMES DAILY PRN
Status: DISCONTINUED | OUTPATIENT
Start: 2019-04-11 | End: 2019-04-12 | Stop reason: HOSPADM

## 2019-04-11 RX ORDER — HYDROMORPHONE HCL/PF 1 MG/ML
0.5 SYRINGE (ML) INJECTION ONCE AS NEEDED
Status: COMPLETED | OUTPATIENT
Start: 2019-04-11 | End: 2019-04-11

## 2019-04-11 RX ORDER — ACETAMINOPHEN 325 MG/1
650 TABLET ORAL EVERY 6 HOURS PRN
Status: DISCONTINUED | OUTPATIENT
Start: 2019-04-11 | End: 2019-04-12 | Stop reason: HOSPADM

## 2019-04-11 RX ORDER — OXYCODONE HYDROCHLORIDE 5 MG/1
5 TABLET ORAL EVERY 4 HOURS PRN
Status: DISCONTINUED | OUTPATIENT
Start: 2019-04-11 | End: 2019-04-12 | Stop reason: HOSPADM

## 2019-04-11 RX ORDER — DIAZEPAM 5 MG/1
5 TABLET ORAL DAILY PRN
Status: DISCONTINUED | OUTPATIENT
Start: 2019-04-11 | End: 2019-04-12 | Stop reason: HOSPADM

## 2019-04-11 RX ORDER — CIPROFLOXACIN 500 MG/1
500 TABLET, FILM COATED ORAL EVERY 12 HOURS SCHEDULED
Status: DISCONTINUED | OUTPATIENT
Start: 2019-04-11 | End: 2019-04-11 | Stop reason: SDUPTHER

## 2019-04-11 RX ORDER — FLUTICASONE PROPIONATE 50 MCG
2 SPRAY, SUSPENSION (ML) NASAL DAILY
Status: DISCONTINUED | OUTPATIENT
Start: 2019-04-11 | End: 2019-04-12 | Stop reason: HOSPADM

## 2019-04-11 RX ORDER — SUMATRIPTAN 25 MG/1
50 TABLET, FILM COATED ORAL DAILY PRN
Status: DISCONTINUED | OUTPATIENT
Start: 2019-04-11 | End: 2019-04-12 | Stop reason: HOSPADM

## 2019-04-11 RX ORDER — CIPROFLOXACIN 500 MG/1
500 TABLET, FILM COATED ORAL EVERY 12 HOURS SCHEDULED
Status: DISCONTINUED | OUTPATIENT
Start: 2019-04-11 | End: 2019-04-11

## 2019-04-11 RX ORDER — SODIUM CHLORIDE 9 MG/ML
125 INJECTION, SOLUTION INTRAVENOUS CONTINUOUS
Status: DISCONTINUED | OUTPATIENT
Start: 2019-04-11 | End: 2019-04-11

## 2019-04-11 RX ORDER — CIPROFLOXACIN 500 MG/1
500 TABLET, FILM COATED ORAL EVERY 12 HOURS SCHEDULED
Status: DISCONTINUED | OUTPATIENT
Start: 2019-04-11 | End: 2019-04-12 | Stop reason: HOSPADM

## 2019-04-11 RX ORDER — ONDANSETRON 2 MG/ML
4 INJECTION INTRAMUSCULAR; INTRAVENOUS EVERY 6 HOURS PRN
Status: DISCONTINUED | OUTPATIENT
Start: 2019-04-11 | End: 2019-04-12 | Stop reason: HOSPADM

## 2019-04-11 RX ORDER — DIPHENHYDRAMINE HCL 25 MG
25 TABLET ORAL EVERY 6 HOURS PRN
Status: DISCONTINUED | OUTPATIENT
Start: 2019-04-11 | End: 2019-04-12 | Stop reason: HOSPADM

## 2019-04-11 RX ORDER — ZOLPIDEM TARTRATE 5 MG/1
10 TABLET ORAL
Status: DISCONTINUED | OUTPATIENT
Start: 2019-04-11 | End: 2019-04-12 | Stop reason: HOSPADM

## 2019-04-11 RX ORDER — HYDROMORPHONE HCL/PF 1 MG/ML
0.5 SYRINGE (ML) INJECTION ONCE AS NEEDED
Status: DISCONTINUED | OUTPATIENT
Start: 2019-04-11 | End: 2019-04-11

## 2019-04-11 RX ORDER — DOCUSATE SODIUM 100 MG/1
100 CAPSULE, LIQUID FILLED ORAL 2 TIMES DAILY
Status: DISCONTINUED | OUTPATIENT
Start: 2019-04-11 | End: 2019-04-12 | Stop reason: HOSPADM

## 2019-04-11 RX ORDER — SODIUM CHLORIDE 9 MG/ML
125 INJECTION, SOLUTION INTRAVENOUS CONTINUOUS
Status: DISCONTINUED | OUTPATIENT
Start: 2019-04-11 | End: 2019-04-12 | Stop reason: HOSPADM

## 2019-04-11 RX ADMIN — ZOLPIDEM TARTRATE 10 MG: 5 TABLET, COATED ORAL at 02:14

## 2019-04-11 RX ADMIN — SODIUM CHLORIDE 125 ML/HR: 0.9 INJECTION, SOLUTION INTRAVENOUS at 03:43

## 2019-04-11 RX ADMIN — SODIUM CHLORIDE 125 ML/HR: 0.9 INJECTION, SOLUTION INTRAVENOUS at 02:14

## 2019-04-11 RX ADMIN — DOCUSATE SODIUM 100 MG: 100 CAPSULE, LIQUID FILLED ORAL at 19:05

## 2019-04-11 RX ADMIN — HYDROMORPHONE HYDROCHLORIDE 0.5 MG: 1 INJECTION, SOLUTION INTRAMUSCULAR; INTRAVENOUS; SUBCUTANEOUS at 00:05

## 2019-04-11 RX ADMIN — CIPROFLOXACIN HYDROCHLORIDE 500 MG: 500 TABLET, FILM COATED ORAL at 02:14

## 2019-04-11 RX ADMIN — CIPROFLOXACIN HYDROCHLORIDE 500 MG: 500 TABLET, FILM COATED ORAL at 21:57

## 2019-04-11 RX ADMIN — HYDROMORPHONE HYDROCHLORIDE 0.5 MG: 1 INJECTION, SOLUTION INTRAMUSCULAR; INTRAVENOUS; SUBCUTANEOUS at 01:06

## 2019-04-11 RX ADMIN — SODIUM CHLORIDE 125 ML/HR: 0.9 INJECTION, SOLUTION INTRAVENOUS at 14:15

## 2019-04-11 RX ADMIN — CIPROFLOXACIN HYDROCHLORIDE 500 MG: 500 TABLET, FILM COATED ORAL at 14:15

## 2019-04-11 RX ADMIN — HYDROMORPHONE HYDROCHLORIDE 0.5 MG: 1 INJECTION, SOLUTION INTRAMUSCULAR; INTRAVENOUS; SUBCUTANEOUS at 09:28

## 2019-04-11 RX ADMIN — ACETAMINOPHEN 650 MG: 325 TABLET, FILM COATED ORAL at 15:21

## 2019-04-11 RX ADMIN — ZOLPIDEM TARTRATE 10 MG: 5 TABLET, COATED ORAL at 21:57

## 2019-04-11 RX ADMIN — DOCUSATE SODIUM 100 MG: 100 CAPSULE, LIQUID FILLED ORAL at 09:28

## 2019-04-11 RX ADMIN — OXYCODONE HYDROCHLORIDE 5 MG: 5 TABLET ORAL at 02:14

## 2019-04-11 RX ADMIN — SODIUM CHLORIDE 125 ML/HR: 0.9 INJECTION, SOLUTION INTRAVENOUS at 21:49

## 2019-04-11 RX ADMIN — ACETAMINOPHEN 650 MG: 325 TABLET, FILM COATED ORAL at 21:57

## 2019-04-12 ENCOUNTER — TELEPHONE (OUTPATIENT)
Dept: GASTROENTEROLOGY | Facility: MEDICAL CENTER | Age: 49
End: 2019-04-12

## 2019-04-12 VITALS
HEART RATE: 72 BPM | DIASTOLIC BLOOD PRESSURE: 69 MMHG | TEMPERATURE: 98.1 F | HEIGHT: 64 IN | WEIGHT: 122 LBS | OXYGEN SATURATION: 98 % | SYSTOLIC BLOOD PRESSURE: 113 MMHG | RESPIRATION RATE: 18 BRPM | BODY MASS INDEX: 20.83 KG/M2

## 2019-04-12 DIAGNOSIS — R10.84 GENERALIZED ABDOMINAL PAIN: Primary | ICD-10-CM

## 2019-04-12 PROCEDURE — 99217 PR OBSERVATION CARE DISCHARGE MANAGEMENT: CPT | Performed by: NURSE PRACTITIONER

## 2019-04-12 RX ORDER — DICYCLOMINE HYDROCHLORIDE 10 MG/1
10 CAPSULE ORAL
Qty: 30 CAPSULE | Refills: 0 | Status: SHIPPED | OUTPATIENT
Start: 2019-04-12 | End: 2019-08-20 | Stop reason: ALTCHOICE

## 2019-04-12 RX ADMIN — ACETAMINOPHEN 650 MG: 325 TABLET, FILM COATED ORAL at 08:22

## 2019-04-12 RX ADMIN — CIPROFLOXACIN HYDROCHLORIDE 500 MG: 500 TABLET, FILM COATED ORAL at 09:52

## 2019-04-12 RX ADMIN — DOCUSATE SODIUM 100 MG: 100 CAPSULE, LIQUID FILLED ORAL at 09:52

## 2019-04-15 ENCOUNTER — TRANSITIONAL CARE MANAGEMENT (OUTPATIENT)
Dept: FAMILY MEDICINE CLINIC | Facility: CLINIC | Age: 49
End: 2019-04-15

## 2019-04-17 ENCOUNTER — TELEPHONE (OUTPATIENT)
Dept: GASTROENTEROLOGY | Facility: CLINIC | Age: 49
End: 2019-04-17

## 2019-04-18 ENCOUNTER — OFFICE VISIT (OUTPATIENT)
Dept: GASTROENTEROLOGY | Facility: CLINIC | Age: 49
End: 2019-04-18
Payer: COMMERCIAL

## 2019-04-18 VITALS
WEIGHT: 122 LBS | HEART RATE: 68 BPM | DIASTOLIC BLOOD PRESSURE: 70 MMHG | HEIGHT: 64 IN | SYSTOLIC BLOOD PRESSURE: 112 MMHG | BODY MASS INDEX: 20.83 KG/M2

## 2019-04-18 DIAGNOSIS — K59.00 CONSTIPATION, UNSPECIFIED CONSTIPATION TYPE: ICD-10-CM

## 2019-04-18 DIAGNOSIS — K59.09 CHRONIC CONSTIPATION: ICD-10-CM

## 2019-04-18 DIAGNOSIS — R12 HEARTBURN: ICD-10-CM

## 2019-04-18 DIAGNOSIS — R10.13 EPIGASTRIC PAIN: ICD-10-CM

## 2019-04-18 DIAGNOSIS — K86.2 PANCREATIC CYST: Primary | ICD-10-CM

## 2019-04-18 PROCEDURE — 99214 OFFICE O/P EST MOD 30 MIN: CPT | Performed by: NURSE PRACTITIONER

## 2019-04-18 RX ORDER — FAMOTIDINE 40 MG/1
40 TABLET, FILM COATED ORAL 2 TIMES DAILY PRN
Qty: 60 TABLET | Refills: 3 | Status: SHIPPED | OUTPATIENT
Start: 2019-04-18 | End: 2020-02-24

## 2019-04-24 ENCOUNTER — DOCUMENTATION (OUTPATIENT)
Dept: GASTROENTEROLOGY | Facility: MEDICAL CENTER | Age: 49
End: 2019-04-24

## 2019-04-24 ENCOUNTER — OFFICE VISIT (OUTPATIENT)
Dept: FAMILY MEDICINE CLINIC | Facility: CLINIC | Age: 49
End: 2019-04-24
Payer: COMMERCIAL

## 2019-04-24 VITALS
HEIGHT: 64 IN | SYSTOLIC BLOOD PRESSURE: 108 MMHG | HEART RATE: 71 BPM | OXYGEN SATURATION: 99 % | DIASTOLIC BLOOD PRESSURE: 74 MMHG | BODY MASS INDEX: 21.1 KG/M2 | TEMPERATURE: 98.7 F | WEIGHT: 123.6 LBS

## 2019-04-24 DIAGNOSIS — K86.2 PANCREATIC CYST: Primary | ICD-10-CM

## 2019-04-24 DIAGNOSIS — D49.0 IPMN (INTRADUCTAL PAPILLARY MUCINOUS NEOPLASM): ICD-10-CM

## 2019-04-24 DIAGNOSIS — G43.001 MIGRAINE WITHOUT AURA AND WITH STATUS MIGRAINOSUS, NOT INTRACTABLE: ICD-10-CM

## 2019-04-24 PROCEDURE — 1036F TOBACCO NON-USER: CPT | Performed by: NURSE PRACTITIONER

## 2019-04-24 PROCEDURE — 99213 OFFICE O/P EST LOW 20 MIN: CPT | Performed by: NURSE PRACTITIONER

## 2019-04-24 RX ORDER — RIZATRIPTAN BENZOATE 10 MG/1
10 TABLET ORAL ONCE AS NEEDED
Qty: 9 TABLET | Refills: 5 | Status: SHIPPED | OUTPATIENT
Start: 2019-04-24 | End: 2022-07-08 | Stop reason: SDUPTHER

## 2019-04-24 RX ORDER — RIZATRIPTAN BENZOATE 10 MG/1
TABLET ORAL
COMMUNITY
Start: 2019-04-23 | End: 2019-04-24 | Stop reason: SDUPTHER

## 2019-04-25 ENCOUNTER — TELEPHONE (OUTPATIENT)
Dept: GASTROENTEROLOGY | Facility: MEDICAL CENTER | Age: 49
End: 2019-04-25

## 2019-04-25 ENCOUNTER — TELEPHONE (OUTPATIENT)
Dept: FAMILY MEDICINE CLINIC | Facility: CLINIC | Age: 49
End: 2019-04-25

## 2019-04-25 DIAGNOSIS — K86.2 PANCREATIC CYST: Primary | ICD-10-CM

## 2019-05-08 ENCOUNTER — TELEPHONE (OUTPATIENT)
Dept: GASTROENTEROLOGY | Facility: AMBULARY SURGERY CENTER | Age: 49
End: 2019-05-08

## 2019-07-26 DIAGNOSIS — F41.9 ANXIETY: ICD-10-CM

## 2019-07-29 RX ORDER — DIAZEPAM 5 MG/1
TABLET ORAL
Qty: 30 TABLET | Refills: 1 | Status: SHIPPED | OUTPATIENT
Start: 2019-07-29 | End: 2020-03-30

## 2019-08-13 ENCOUNTER — OFFICE VISIT (OUTPATIENT)
Dept: URGENT CARE | Facility: CLINIC | Age: 49
End: 2019-08-13
Payer: COMMERCIAL

## 2019-08-13 VITALS
TEMPERATURE: 98.6 F | BODY MASS INDEX: 21 KG/M2 | SYSTOLIC BLOOD PRESSURE: 127 MMHG | WEIGHT: 123 LBS | HEART RATE: 72 BPM | RESPIRATION RATE: 18 BRPM | DIASTOLIC BLOOD PRESSURE: 74 MMHG | HEIGHT: 64 IN | OXYGEN SATURATION: 99 %

## 2019-08-13 DIAGNOSIS — M54.42 LEFT-SIDED LOW BACK PAIN WITH LEFT-SIDED SCIATICA, UNSPECIFIED CHRONICITY: Primary | ICD-10-CM

## 2019-08-13 PROCEDURE — 99214 OFFICE O/P EST MOD 30 MIN: CPT | Performed by: PHYSICIAN ASSISTANT

## 2019-08-13 RX ORDER — METHYLPREDNISOLONE 4 MG/1
TABLET ORAL
Qty: 1 EACH | Refills: 0 | Status: SHIPPED | OUTPATIENT
Start: 2019-08-13 | End: 2019-08-20 | Stop reason: ALTCHOICE

## 2019-08-13 NOTE — PROGRESS NOTES
[id number] [facility]        NAME: Elda Newby is a 46 y o  female  : 1970    MRN: [id number]  DATE: 2019  TIME: 1:48 PM    Assessment and Plan   Left-sided low back pain with left-sided sciatica, unspecified chronicity [M54 42]  1  Left-sided low back pain with left-sided sciatica, unspecified chronicity  methylPREDNISolone 4 MG tablet therapy pack         Patient Instructions     Symptoms are consistent with sciatica  -Apply warm, moist heat  -Gentle stretching  -Use steroid pack as directed  -Follow-up with PCP within 1 week for re-evaluation or if no improvement    Go to ER with worsening symptoms, worsening pain, fever, numbness/tingling, bowel/bladder incontinence, weakness, or any new concerns    Chief Complaint     Chief Complaint   Patient presents with    Back Pain     pt c/o low left sided back pain x 1 month, states pain radiates down leg tried advil and mobic without relief          History of Present Illness       Patient is a 64-year-old female who presents today for evaluation of left lower back pain  She states that this has been on and off for the past month or so  She states the pain starts in her left buttock area and at times radiates down the back of her left leg  She states that she has tried taking Advil and Mobic without much relief  Patient denies any injury or trauma that she is aware of  Review of Systems   Review of Systems   Constitutional: Negative for chills and fever  Respiratory: Negative for shortness of breath  Cardiovascular: Negative for chest pain  Genitourinary: Negative for difficulty urinating  Musculoskeletal: Positive for back pain  Skin: Negative for rash  Neurological: Negative for weakness and numbness  All other systems reviewed and are negative          Current Medications       Current Outpatient Medications:     diazepam (VALIUM) 5 mg tablet, TAKE 1 TABLET BY MOUTH  DAILY AS NEEDED FOR ANXIETY, Disp: 30 tablet, Rfl: 1    dicyclomine (BENTYL) 10 mg capsule, Take 1 capsule (10 mg total) by mouth 4 (four) times a day (before meals and at bedtime) (Patient not taking: Reported on 4/18/2019), Disp: 30 capsule, Rfl: 0    diphenhydrAMINE (BENADRYL) 25 mg capsule, Take 25 mg by mouth every 6 (six) hours as needed for itching, Disp: , Rfl:     famotidine (PEPCID) 40 MG tablet, Take 1 tablet (40 mg total) by mouth 2 (two) times a day as needed for heartburn for up to 30 days (Patient not taking: Reported on 4/24/2019), Disp: 60 tablet, Rfl: 3    fluticasone (FLONASE) 50 mcg/act nasal spray, 2 sprays into each nostril daily, Disp: , Rfl:     Linaclotide (LINZESS) 145 MCG CAPS, Take 1 capsule (145 mcg total) by mouth daily Wait 1/2 hr before you eat, Disp: 90 capsule, Rfl: 3    methylPREDNISolone 4 MG tablet therapy pack, Use as directed on package, Disp: 1 each, Rfl: 0    MULTIPLE VITAMIN PO, Take 1 tablet by mouth daily, Disp: , Rfl:     rizatriptan (MAXALT) 10 MG tablet, Take 1 tablet (10 mg total) by mouth once as needed for migraine for up to 1 dose, Disp: 9 tablet, Rfl: 5    zolpidem (AMBIEN) 10 mg tablet, TAKE 1 TABLET BY MOUTH  DAILY AT BEDTIME AS NEEDED, Disp: 30 tablet, Rfl: 1    Current Allergies     Allergies as of 08/13/2019    (No Known Allergies)            The following portions of the patient's history were reviewed and updated as appropriate: allergies, current medications, past family history, past medical history, past social history, past surgical history and problem list      Past Medical History:   Diagnosis Date    Anxiety     Insomnia     Migraines     Pancreas cyst        Past Surgical History:   Procedure Laterality Date    BREAST SURGERY      HERNIA REPAIR      AR EDG US EXAM SURGICAL ALTER STOM DUODENUM/JEJUNUM N/A 4/10/2019    Procedure: LINEAR ENDOSCOPIC U/S;  Surgeon: Joe Castrejon MD;  Location: [facility];   Service: Gastroenterology    SHOULDER SURGERY Right     SHOULDER SURGERY Family History   Problem Relation Age of Onset    Substance Abuse Family          Medications have been verified  Objective   /74   Pulse 72   Temp 98 6 °F (37 °C)   Resp 18   Ht 5' 4" (1 626 m)   Wt 55 8 kg (123 lb)   SpO2 99%   BMI 21 11 kg/m²        Physical Exam     Physical Exam   Constitutional: She is oriented to person, place, and time  She appears well-developed and well-nourished  No distress  Cardiovascular: Normal rate, regular rhythm and normal heart sounds  Pulmonary/Chest: Effort normal and breath sounds normal    Musculoskeletal: Normal range of motion  Lumbar back: She exhibits normal range of motion  Back:    Neurological: She is alert and oriented to person, place, and time  She has normal strength  No sensory deficit  Reflex Scores:       Patellar reflexes are 2+ on the right side and 2+ on the left side  Achilles reflexes are 2+ on the right side and 2+ on the left side  Skin: Skin is warm and dry  Psychiatric: She has a normal mood and affect  Nursing note and vitals reviewed

## 2019-08-13 NOTE — PATIENT INSTRUCTIONS
-Apply warm, moist heat  -Gentle stretching  -Use steroid pack as directed  -Follow-up with PCP within 1 week for re-evaluation or if no improvement    Go to ER with worsening symptoms, worsening pain, fever, numbness/tingling, bowel/bladder incontinence, weakness, or any new concerns    Back Pain   WHAT YOU NEED TO KNOW:   Back pain is common  It can be caused by many conditions, such as arthritis or the breakdown of spinal discs  Your risk for back pain is increased by injuries, lack of activity, or repeated bending and twisting  You may feel sore or stiff on one or both sides of your back  The pain may spread to your buttocks or thighs  DISCHARGE INSTRUCTIONS:   Medicines:   · NSAIDs  help decrease swelling and pain  This medicine is available with or without a doctor's order  NSAIDs can cause stomach bleeding or kidney problems in certain people  If you take blood thinner medicine, always ask your healthcare provider if NSAIDs are safe for you  Always read the medicine label and follow directions  · Acetaminophen  decreases pain  It is available without a doctor's order  Ask how much to take and how often to take it  Follow directions  Acetaminophen can cause liver damage if not taken correctly  · Prescription pain medicine  may be given  Ask your healthcare provider how to take this medicine safely  · Take your medicine as directed  Contact your healthcare provider if you think your medicine is not helping or if you have side effects  Tell him or her if you are allergic to any medicine  Keep a list of the medicines, vitamins, and herbs you take  Include the amounts, and when and why you take them  Bring the list or the pill bottles to follow-up visits  Carry your medicine list with you in case of an emergency  Follow up with your healthcare provider in 2 weeks, or as directed:  Write down your questions so you remember to ask them during your visits    How to manage your back pain:   · Apply ice  on your back or affected area for 15 to 20 minutes every hour or as directed  Use an ice pack, or put crushed ice in a plastic bag  Cover it with a towel  Ice helps prevent tissue damage and decreases pain  · Apply heat  on your back or affected area for 20 to 30 minutes every 2 hours for as many days as directed  Heat helps decrease pain and muscle spasms  · Stay active  as much as you can without causing more pain  Bed rest could make your back pain worse  Avoid heavy lifting until your pain is gone  Return to the emergency department if:   · You have pain, numbness, or weakness in one or both legs  · Your pain becomes so severe that you cannot walk  · You cannot control your urine or bowel movements  · You have severe back pain with chest pain  · You have severe back pain, nausea, and vomiting  · You have severe back pain that spreads to your side or genital area  Contact your healthcare provider if:   · You have back pain that does not get better with rest and pain medicine  · You have a fever  · You have pain that worsens when you are on your back or when you rest     · You have pain that worsens when you cough or sneeze  · You lose weight without trying  · You have questions or concerns about your condition or care  © 2017 [facility] Information is for End User's use only and may not be sold, redistributed or otherwise used for commercial purposes  All illustrations and images included in CareNotes® are the copyrighted property of [employer]  or [employer]  The above information is an  only  It is not intended as medical advice for individual conditions or treatments  Talk to your doctor, nurse or pharmacist before following any medical regimen to see if it is safe and effective for you

## 2019-08-20 ENCOUNTER — OFFICE VISIT (OUTPATIENT)
Dept: FAMILY MEDICINE CLINIC | Facility: CLINIC | Age: 49
End: 2019-08-20
Payer: COMMERCIAL

## 2019-08-20 VITALS
WEIGHT: 125.8 LBS | DIASTOLIC BLOOD PRESSURE: 70 MMHG | SYSTOLIC BLOOD PRESSURE: 100 MMHG | HEART RATE: 73 BPM | TEMPERATURE: 99.4 F | BODY MASS INDEX: 21.48 KG/M2 | RESPIRATION RATE: 16 BRPM | HEIGHT: 64 IN | OXYGEN SATURATION: 98 %

## 2019-08-20 DIAGNOSIS — M54.50 CHRONIC LEFT-SIDED LOW BACK PAIN WITHOUT SCIATICA: ICD-10-CM

## 2019-08-20 DIAGNOSIS — Z12.11 SCREENING FOR COLON CANCER: Primary | ICD-10-CM

## 2019-08-20 DIAGNOSIS — G89.29 CHRONIC LEFT-SIDED LOW BACK PAIN WITHOUT SCIATICA: ICD-10-CM

## 2019-08-20 PROBLEM — M54.42 CHRONIC LEFT-SIDED LOW BACK PAIN WITH LEFT-SIDED SCIATICA: Status: ACTIVE | Noted: 2019-08-20

## 2019-08-20 PROCEDURE — 3008F BODY MASS INDEX DOCD: CPT | Performed by: NURSE PRACTITIONER

## 2019-08-20 PROCEDURE — 1036F TOBACCO NON-USER: CPT | Performed by: NURSE PRACTITIONER

## 2019-08-20 PROCEDURE — 99213 OFFICE O/P EST LOW 20 MIN: CPT | Performed by: NURSE PRACTITIONER

## 2019-08-20 RX ORDER — PREDNISONE 10 MG/1
TABLET ORAL
Qty: 30 TABLET | Refills: 0 | Status: SHIPPED | OUTPATIENT
Start: 2019-08-20 | End: 2020-02-24

## 2019-08-20 RX ORDER — CYCLOBENZAPRINE HCL 10 MG
10 TABLET ORAL 3 TIMES DAILY PRN
Qty: 21 TABLET | Refills: 0 | Status: SHIPPED | OUTPATIENT
Start: 2019-08-20 | End: 2019-09-10 | Stop reason: SDUPTHER

## 2019-08-20 NOTE — PATIENT INSTRUCTIONS
Back Pain   AMBULATORY CARE:   Back pain  is common  You may feel sore or stiff on one or both sides of your back  The pain may spread to your buttocks or thighs  Back pain may be caused by an injury, lack of exercise, or obesity  Repeated bending, lifting, twisting, or lifting heavy items can also cause back pain  Seek immediate care for the following symptoms:   · Pain, numbness, or weakness in one or both legs    · Pain that is so severe, you cannot walk    · Unable to control your urine or bowel movements    · Severe back pain with chest pain    · Severe back pain, nausea, and vomiting    · Severe back pain that spreads to your side or genital area  Contact your healthcare provider if:   · You have back pain that does not get better with rest and pain medicine  · You have a fever  · You have pain that worsens when you are on your back or when you rest     · You have pain that worsens when you cough or sneeze  · You lose weight without trying  · You have questions or concerns about your condition or care  Treatment for back pain  may include any of the following:  · NSAIDs , such as ibuprofen, help decrease swelling, pain, and fever  This medicine is available with or without a doctor's order  NSAIDs can cause stomach bleeding or kidney problems in certain people  If you take blood thinner medicine, always ask your healthcare provider if NSAIDs are safe for you  Always read the medicine label and follow directions  · Acetaminophen  decreases pain  It is available without a doctor's order  Ask how much to take and how often to take it  Follow directions  Acetaminophen can cause liver damage if not taken correctly  · Prescription pain medicine  may be given  Ask your healthcare provider how to take this medicine safely  Manage your back pain:   · Apply ice  on your back for 15 to 20 minutes every hour or as directed  Use an ice pack, or put crushed ice in a plastic bag  Cover it with a towel  Ice helps decrease swelling and pain  · Apply heat  on your back for 20 to 30 minutes every 2 hours for as many days as directed  Heat helps decrease pain and muscle spasms  You can alternate ice and heat  · Stay active  as much as you can without causing more pain  Bed rest could make your back pain worse  Avoid heavy lifting until your pain is gone  Follow up with your healthcare provider as directed:  Write down your questions so you remember to ask them during your visits  © 2017 [facility] Information is for End User's use only and may not be sold, redistributed or otherwise used for commercial purposes  All illustrations and images included in CareNotes® are the copyrighted property of [employer]  or [employer]  The above information is an  only  It is not intended as medical advice for individual conditions or treatments  Talk to your doctor, nurse or pharmacist before following any medical regimen to see if it is safe and effective for you

## 2019-08-20 NOTE — PROGRESS NOTES
Assessment/Plan:    Problem List Items Addressed This Visit        Nervous and Auditory    Chronic left-sided low back pain with left-sided sciatica    Relevant Medications    cyclobenzaprine (FLEXERIL) 10 mg tablet    predniSONE 10 mg tablet       Other    Screening for colon cancer - Primary    Relevant Orders    Ambulatory referral to Gastroenterology           Diagnoses and all orders for this visit:    Screening for colon cancer  -     Ambulatory referral to Gastroenterology; Future    Chronic left-sided low back pain without sciatica  -     cyclobenzaprine (FLEXERIL) 10 mg tablet; Take 1 tablet (10 mg total) by mouth 3 (three) times a day as needed for muscle spasms  -     predniSONE 10 mg tablet; 4 tablets for 3 days, 3 tablets for 3 days, 2 tablets for 3 days, 1 tablet for 3 days, then D/C  Other orders  -     Cancel: Cologuard; Future        No problem-specific Assessment & Plan notes found for this encounter  Subjective:      Patient ID: Corrina Gallegos is a 46 y o  female  Corrina Gallegos presents with c/o back pain  Reports that she had similar symptoms about 4 weeks ago and was Tx with Medrol 4mg dose pack  States that is did seem to help but realized that she was taking the medication wrong as she was taking the daily dose all at once  Denies any muscle relaxer prescription given  Will Tx with Prednisone 10 taper, Flexeril TID, and OTC Advil with ROM and stretching exercises  Stretching exercises and deomnstration done in office today  If symptoms do not resolve, will consider PT and Ortho referral  Pt is agreeable with Tx plan  Back Pain   This is a recurrent problem  The current episode started 1 to 4 weeks ago  The problem occurs constantly  The problem is unchanged  The pain is present in the sacro-iliac  The quality of the pain is described as shooting  The pain radiates to the left thigh  The pain is at a severity of 6/10  The pain is moderate   The pain is the same all the time  The symptoms are aggravated by bending and position  Pertinent negatives include no abdominal pain, bladder incontinence, bowel incontinence, chest pain, dysuria, fever, headaches, leg pain, numbness, paresis, paresthesias, pelvic pain, perianal numbness, tingling, weakness or weight loss  Risk factors include menopause  She has tried analgesics, heat, home exercises, ice and NSAIDs for the symptoms  The treatment provided mild relief  The following portions of the patient's history were reviewed and updated as appropriate:   She has a past medical history of Anxiety, Insomnia, Migraines, and Pancreas cyst ,  does not have any pertinent problems on file  ,   has a past surgical history that includes Breast surgery; Shoulder surgery (Right); Hernia repair; Shoulder surgery; and pr edg us exam surgical alter stom duodenum/jejunum (N/A, 4/10/2019)  ,  family history includes Substance Abuse in her family  ,   reports that she has quit smoking  She has never used smokeless tobacco  She reports that she drinks alcohol  She reports that she does not use drugs  ,  has No Known Allergies     Current Outpatient Medications   Medication Sig Dispense Refill    diazepam (VALIUM) 5 mg tablet TAKE 1 TABLET BY MOUTH  DAILY AS NEEDED FOR ANXIETY 30 tablet 1    diphenhydrAMINE (BENADRYL) 25 mg capsule Take 25 mg by mouth every 6 (six) hours as needed for itching      fluticasone (FLONASE) 50 mcg/act nasal spray 2 sprays into each nostril daily      Linaclotide (LINZESS) 145 MCG CAPS Take 1 capsule (145 mcg total) by mouth daily Wait 1/2 hr before you eat 90 capsule 3    rizatriptan (MAXALT) 10 MG tablet Take 1 tablet (10 mg total) by mouth once as needed for migraine for up to 1 dose 9 tablet 5    zolpidem (AMBIEN) 10 mg tablet TAKE 1 TABLET BY MOUTH  DAILY AT BEDTIME AS NEEDED 30 tablet 1    cyclobenzaprine (FLEXERIL) 10 mg tablet Take 1 tablet (10 mg total) by mouth 3 (three) times a day as needed for muscle spasms 21 tablet 0    famotidine (PEPCID) 40 MG tablet Take 1 tablet (40 mg total) by mouth 2 (two) times a day as needed for heartburn for up to 30 days (Patient not taking: Reported on 4/24/2019) 60 tablet 3    predniSONE 10 mg tablet 4 tablets for 3 days, 3 tablets for 3 days, 2 tablets for 3 days, 1 tablet for 3 days, then D/C  30 tablet 0     No current facility-administered medications for this visit  Review of Systems   Constitutional: Negative  Negative for fever and weight loss  HENT: Negative  Eyes: Negative  Respiratory: Negative  Cardiovascular: Negative  Negative for chest pain  Gastrointestinal: Negative  Negative for abdominal pain and bowel incontinence  Endocrine: Negative  Genitourinary: Negative  Negative for bladder incontinence, dysuria and pelvic pain  Musculoskeletal: Positive for back pain  Skin: Negative  Allergic/Immunologic: Negative  Neurological: Negative  Negative for tingling, weakness, numbness, headaches and paresthesias  Hematological: Negative  Psychiatric/Behavioral: Negative  Objective:  Vitals:    08/20/19 1137   BP: 100/70   BP Location: Right arm   Patient Position: Sitting   Cuff Size: Standard   Pulse: 73   Resp: 16   Temp: 99 4 °F (37 4 °C)   TempSrc: Tympanic   SpO2: 98%   Weight: 57 1 kg (125 lb 12 8 oz)   Height: 5' 4" (1 626 m)     Body mass index is 21 59 kg/m²  Physical Exam   Constitutional: She is oriented to person, place, and time  She appears well-developed and well-nourished  HENT:   Head: Normocephalic and atraumatic  Right Ear: Tympanic membrane, external ear and ear canal normal    Left Ear: Tympanic membrane, external ear and ear canal normal    Nose: Nose normal    Mouth/Throat: Uvula is midline, oropharynx is clear and moist and mucous membranes are normal    Eyes: Pupils are equal, round, and reactive to light   Conjunctivae, EOM and lids are normal    Neck: Trachea normal, normal range of motion, full passive range of motion without pain and phonation normal  Neck supple  No JVD present  No thyroid mass and no thyromegaly present  Cardiovascular: Normal rate, regular rhythm, S1 normal, S2 normal, normal heart sounds, intact distal pulses and normal pulses  Exam reveals no gallop and no friction rub  No murmur heard  Pulmonary/Chest: Effort normal and breath sounds normal  She has no decreased breath sounds  Abdominal: Soft  Normal appearance and bowel sounds are normal  There is no hepatosplenomegaly  No hernia  Genitourinary:   Genitourinary Comments: Deferred    Musculoskeletal: Normal range of motion  Lumbar back: She exhibits tenderness and spasm  She exhibits normal range of motion, no bony tenderness, no swelling, no deformity and normal pulse  Back:    Neurological: She is alert and oriented to person, place, and time  She has normal reflexes  No cranial nerve deficit  Skin: Skin is warm, dry and intact  Capillary refill takes less than 2 seconds  Psychiatric: She has a normal mood and affect  Her speech is normal and behavior is normal  Judgment and thought content normal  Cognition and memory are normal    Nursing note and vitals reviewed

## 2019-09-10 ENCOUNTER — APPOINTMENT (OUTPATIENT)
Dept: RADIOLOGY | Facility: CLINIC | Age: 49
End: 2019-09-10
Payer: COMMERCIAL

## 2019-09-10 DIAGNOSIS — G89.29 CHRONIC LEFT-SIDED LOW BACK PAIN WITHOUT SCIATICA: ICD-10-CM

## 2019-09-10 DIAGNOSIS — M54.42 CHRONIC LEFT-SIDED LOW BACK PAIN WITH LEFT-SIDED SCIATICA: Primary | ICD-10-CM

## 2019-09-10 DIAGNOSIS — G89.29 CHRONIC LEFT-SIDED LOW BACK PAIN WITH LEFT-SIDED SCIATICA: Primary | ICD-10-CM

## 2019-09-10 DIAGNOSIS — M54.50 CHRONIC LEFT-SIDED LOW BACK PAIN WITHOUT SCIATICA: ICD-10-CM

## 2019-09-10 DIAGNOSIS — G89.29 CHRONIC LEFT-SIDED LOW BACK PAIN WITH LEFT-SIDED SCIATICA: ICD-10-CM

## 2019-09-10 DIAGNOSIS — M54.42 CHRONIC LEFT-SIDED LOW BACK PAIN WITH LEFT-SIDED SCIATICA: ICD-10-CM

## 2019-09-10 PROCEDURE — 72110 X-RAY EXAM L-2 SPINE 4/>VWS: CPT

## 2019-09-10 RX ORDER — CYCLOBENZAPRINE HCL 10 MG
TABLET ORAL
Qty: 21 TABLET | Refills: 0 | Status: SHIPPED | OUTPATIENT
Start: 2019-09-10

## 2019-09-10 NOTE — TELEPHONE ENCOUNTER
XR Lumbar noninjury placed    Renewed Flexeril  PT ordered placed  If no improvement, Ortho referral

## 2019-09-10 NOTE — TELEPHONE ENCOUNTER
Pt back is still not feeling better, pt said De[facility] Pitcher told her if it did not get better she could have an x-ray  She would like a script put in so she can go to the  for x-ray

## 2019-09-18 NOTE — RESULT ENCOUNTER NOTE
Please call the patient regarding her normal lumbar xray result: There is no evidence of acute fracture or destructive osseous lesion  Alignment is unremarkable  No significant lumbar degenerative change noted  The pedicles appear intact  There is an intrauterine device present within the pelvis  Recommend PT for core muscle strengthening, posture, and body mechanics

## 2019-10-01 DIAGNOSIS — G47.9 SLEEP DISTURBANCE: ICD-10-CM

## 2019-10-01 RX ORDER — ZOLPIDEM TARTRATE 10 MG/1
TABLET ORAL
Qty: 30 TABLET | Refills: 3 | Status: SHIPPED | OUTPATIENT
Start: 2019-10-01 | End: 2021-08-11 | Stop reason: SDUPTHER

## 2019-10-23 ENCOUNTER — TELEPHONE (OUTPATIENT)
Dept: GASTROENTEROLOGY | Facility: CLINIC | Age: 49
End: 2019-10-23

## 2019-10-23 NOTE — TELEPHONE ENCOUNTER
Patients GI provider:  Dr Plata [facility]     Number to return call: [phone]    Reason for call: Please call the pt in regards to the auth for the MRI  Per the [employer] company, the [address] that was received was under the pts  name and , and it was also submitted and approved under his name/   Please call back to clarify     Scheduled procedure/appointment date if applicable: Apt/procedure - n/a

## 2019-10-23 NOTE — TELEPHONE ENCOUNTER
Called geena and spoke to Dia and she stated the case was approved and she is ok to go to [employer] prior [place] # [id number] patient is aware and very pleased  She did apologize if she was rude to anyone  I also apologized to her for having to deal with the issue the ins created and getting her worked up, she was very understanding

## 2019-10-23 NOTE — TELEPHONE ENCOUNTER
I spoke to the patient and she was very upset, so I did call the [employer] company spoke with Bernadette GUADALUPE and she looked up the case that was opened yesterday by Honorio Russo  While I was on the phone her she messaged Glenny Cesar to find out why she opened the case under the  she stated that the patients name didn't come up as a covered person on the plan  Bernadette had to open another case and build a profile for the patient and send it for eligibility then process for approval  New case # is [id number]  I spoke to Tennille Fuller and let her know everything I found out and what evicore is going to do  They will pull the phone call log to find out how and why the case was opened under the   She was very understanding but still upset  I will keep her posted when ever I find out any information

## 2019-10-24 ENCOUNTER — HOSPITAL ENCOUNTER (OUTPATIENT)
Dept: MRI IMAGING | Facility: HOSPITAL | Age: 49
Discharge: HOME | End: 2019-10-24
Attending: INTERNAL MEDICINE
Payer: COMMERCIAL

## 2019-10-24 DIAGNOSIS — K86.2 PANCREATIC CYST: ICD-10-CM

## 2019-10-24 PROCEDURE — 74181 MRI ABDOMEN W/O CONTRAST: CPT

## 2019-10-29 ENCOUNTER — TELEPHONE (OUTPATIENT)
Dept: GASTROENTEROLOGY | Facility: CLINIC | Age: 49
End: 2019-10-29

## 2019-10-29 NOTE — TELEPHONE ENCOUNTER
Patients GI provider:  Dr Lady Bush    Number to return call: [phone]    Reason for call: Denia Figueredo from [facility] calling for significant findings ready to be read in epic   Tiger texted     Scheduled procedure/appointment date if applicable: Apt/procedure n/a

## 2019-10-29 NOTE — TELEPHONE ENCOUNTER
I called her to let her know that her pancreatic cyst is stable and that she should repeat imaging in 1 year, there was no answer so I left a message for her to call back

## 2019-11-16 ENCOUNTER — TELEPHONE (OUTPATIENT)
Dept: FAMILY MEDICINE CLINIC | Facility: CLINIC | Age: 49
End: 2019-11-16

## 2019-11-19 ENCOUNTER — TELEPHONE (OUTPATIENT)
Dept: FAMILY MEDICINE CLINIC | Facility: CLINIC | Age: 49
End: 2019-11-19

## 2020-02-03 ENCOUNTER — TELEPHONE (OUTPATIENT)
Dept: GASTROENTEROLOGY | Facility: CLINIC | Age: 50
End: 2020-02-03

## 2020-02-03 NOTE — TELEPHONE ENCOUNTER
Jennifer Mckeon will come in for paperwork and a date patient will be transferring care from Dr Geri Polo to Dr Brito

## 2020-02-03 NOTE — TELEPHONE ENCOUNTER
02/03/20  Screened by: Esa Ford    Referring Provider Jacobo Lloyd    : If patient answers NO to medical questions, then schedule procedure  If patient answers YES to medical questions, then schedule office appointment  Previous Colonoscopy no  Date and Facility of last colonoscopy? Comments:         Pre- Screening: There is no height or weight on file to calculate BMI  Has patient been referred for a routine screening Colonoscopy? yes  Is the patient between 39-70 years old? yes    SCHEDULING STAFF   If the patient is between 45yrs-49yrs, please advise patient to confirm benefits/coverage with their insurance company for a routine screening colonoscopy, some insurance carriers will only cover at [phone] or older   If the patient is over 76years old, please schedule an office visit   If the patient had a previous colonoscopy send to the procedure  before continuing    Have you been diagnosed with a bleeding disorder or anemia? no    Do you take no    Have you been diagnosed with Diabetes or are you taking any   Diabetic medications? no    Do you have any of the following symptoms?  no    Have you had a coronary or vascular stent within the last year? no    Have you had a heart attack or stroke in the last 6 months? no    Have you had intestinal surgery in the last 3 months? no    Do you have problems with: no    Do you use: no    Have you been hospitalized in the last Month? no    Have you had chest pain (angina) or breathing problems  (COPD) in the last 3 months? no    Do you have any difficulty walking up a flight of stairs? no    Have you had Kidney failure or insufficiency? no    Have you had heart valve surgery? no    Are you confined to a wheelchair?  no

## 2020-02-23 ENCOUNTER — ANESTHESIA EVENT (OUTPATIENT)
Dept: GASTROENTEROLOGY | Facility: HOSPITAL | Age: 50
End: 2020-02-23

## 2020-02-24 ENCOUNTER — HOSPITAL ENCOUNTER (OUTPATIENT)
Dept: GASTROENTEROLOGY | Facility: HOSPITAL | Age: 50
Setting detail: OUTPATIENT SURGERY
Discharge: HOME | End: 2020-02-24
Attending: INTERNAL MEDICINE | Admitting: INTERNAL MEDICINE
Payer: COMMERCIAL

## 2020-02-24 ENCOUNTER — ANESTHESIA (OUTPATIENT)
Dept: GASTROENTEROLOGY | Facility: HOSPITAL | Age: 50
End: 2020-02-24

## 2020-02-24 VITALS
WEIGHT: 131.39 LBS | SYSTOLIC BLOOD PRESSURE: 118 MMHG | DIASTOLIC BLOOD PRESSURE: 61 MMHG | TEMPERATURE: 97.6 F | BODY MASS INDEX: 22.43 KG/M2 | RESPIRATION RATE: 13 BRPM | HEART RATE: 67 BPM | HEIGHT: 64 IN | OXYGEN SATURATION: 100 %

## 2020-02-24 DIAGNOSIS — Z12.11 SCREENING FOR COLON CANCER: ICD-10-CM

## 2020-02-24 LAB
EXT PREGNANCY TEST URINE: NEGATIVE
EXT. CONTROL: NORMAL

## 2020-02-24 PROCEDURE — G0105 COLORECTAL SCRN; HI RISK IND: HCPCS | Performed by: INTERNAL MEDICINE

## 2020-02-24 PROCEDURE — 81025 URINE PREGNANCY TEST: CPT | Performed by: ANESTHESIOLOGY

## 2020-02-24 RX ORDER — LIDOCAINE HYDROCHLORIDE 10 MG/ML
INJECTION, SOLUTION EPIDURAL; INFILTRATION; INTRACAUDAL; PERINEURAL AS NEEDED
Status: DISCONTINUED | OUTPATIENT
Start: 2020-02-24 | End: 2020-02-24 | Stop reason: SURG

## 2020-02-24 RX ORDER — SODIUM CHLORIDE, SODIUM LACTATE, POTASSIUM CHLORIDE, CALCIUM CHLORIDE 600; 310; 30; 20 MG/100ML; MG/100ML; MG/100ML; MG/100ML
125 INJECTION, SOLUTION INTRAVENOUS CONTINUOUS
Status: DISCONTINUED | OUTPATIENT
Start: 2020-02-24 | End: 2020-02-28 | Stop reason: HOSPADM

## 2020-02-24 RX ORDER — PROPOFOL 10 MG/ML
INJECTION, EMULSION INTRAVENOUS AS NEEDED
Status: DISCONTINUED | OUTPATIENT
Start: 2020-02-24 | End: 2020-02-24 | Stop reason: SURG

## 2020-02-24 RX ADMIN — SODIUM CHLORIDE, SODIUM LACTATE, POTASSIUM CHLORIDE, AND CALCIUM CHLORIDE 125 ML/HR: .6; .31; .03; .02 INJECTION, SOLUTION INTRAVENOUS at 09:28

## 2020-02-24 RX ADMIN — PROPOFOL 25 MG: 10 INJECTION, EMULSION INTRAVENOUS at 09:40

## 2020-02-24 RX ADMIN — PROPOFOL 25 MG: 10 INJECTION, EMULSION INTRAVENOUS at 09:41

## 2020-02-24 RX ADMIN — PROPOFOL 25 MG: 10 INJECTION, EMULSION INTRAVENOUS at 09:43

## 2020-02-24 RX ADMIN — PROPOFOL 50 MG: 10 INJECTION, EMULSION INTRAVENOUS at 09:36

## 2020-02-24 RX ADMIN — PROPOFOL 25 MG: 10 INJECTION, EMULSION INTRAVENOUS at 09:42

## 2020-02-24 RX ADMIN — LIDOCAINE HYDROCHLORIDE 20 MG: 10 INJECTION, SOLUTION EPIDURAL; INFILTRATION; INTRACAUDAL; PERINEURAL at 09:34

## 2020-02-24 RX ADMIN — PROPOFOL 100 MG: 10 INJECTION, EMULSION INTRAVENOUS at 09:34

## 2020-02-24 RX ADMIN — PROPOFOL 25 MG: 10 INJECTION, EMULSION INTRAVENOUS at 09:38

## 2020-02-24 NOTE — DISCHARGE INSTRUCTIONS
Colonoscopy   WHAT YOU NEED TO KNOW:   A colonoscopy is a procedure to examine the inside of your colon (intestine) with a scope  Polyps or tissue growths may have been removed during your colonoscopy  It is normal to feel bloated and to have some abdominal discomfort  You should be passing gas  If you have hemorrhoids or you had polyps removed, you may have a small amount of bleeding  DISCHARGE INSTRUCTIONS:   Seek care immediately if:   · You have a large amount of bright red blood in your bowel movements  · Your abdomen is hard and firm and you have severe pain  · You have sudden trouble breathing  Contact your healthcare provider if:   · You develop a rash or hives  · You have a fever within 24 hours of your procedure  · You have not had a bowel movement for 3 days after your procedure  · You have questions or concerns about your condition or care  Activity:   · Do not lift, strain, or run  for 3 days after your procedure  · Rest after your procedure  You have been given medicine to relax you  Do not  drive or make important decisions until the day after your procedure  Return to your normal activity as directed  · Relieve gas and discomfort from bloating  by lying on your right side with a heating pad on your abdomen  You may need to take short walks to help the gas move out  Eat small meals until bloating is relieved  If you had polyps removed: For 7 days after your procedure:  · Do not  take aspirin  · Do not  go on long car rides  Help prevent constipation:   · Eat a variety of healthy foods  Healthy foods include fruit, vegetables, whole-grain breads, low-fat dairy products, beans, lean meat, and fish  Ask if you need to be on a special diet  Your healthcare provider may recommend that you eat high-fiber foods such as cooked beans  Fiber helps you have regular bowel movements  · Drink liquids as directed    Adults should drink between 9 and 13 eight-ounce cups of liquid every day  Ask what amount is best for you  For most people, good liquids to drink are water, juice, and milk  · Exercise as directed  Talk to your healthcare provider about the best exercise plan for you  Exercise can help prevent constipation, decrease your blood pressure and improve your health  Follow up with your healthcare provider as directed:  Write down your questions so you remember to ask them during your visits  © 2017 [employer] Information is for End User's use only and may not be sold, redistributed or otherwise used for commercial purposes  All illustrations and images included in CareNotes® are the copyrighted property of [employer]  or [employer] Fleming  The above information is an  only  It is not intended as medical advice for individual conditions or treatments  Talk to your doctor, nurse or pharmacist before following any medical regimen to see if it is safe and effective for you

## 2020-02-24 NOTE — H&P
History and Physical - [facility]  Yazmin Pringle 44 y o  female MRN: [id number]                  HPI: Yazmin Pringle is a 44y o  year old female who presents for colon cancer screening for colonoscopy      REVIEW OF SYSTEMS: Per the HPI, and otherwise unremarkable  Historical Information   Past Medical History:   Diagnosis Date    Anxiety     Insomnia     Migraines     Pancreas cyst      Past Surgical History:   Procedure Laterality Date    BREAST SURGERY      HERNIA REPAIR      CA EDG US EXAM SURGICAL ALTER STOM DUODENUM/JEJUNUM N/A 4/10/2019    Procedure: LINEAR ENDOSCOPIC U/S;  Surgeon: Julia Pendleton MD;  Location: [facility]; Service: Gastroenterology    SHOULDER SURGERY Right     SHOULDER SURGERY       Social History   Social History     Substance and Sexual Activity   Alcohol Use Yes    Frequency: Monthly or less    Drinks per session: 1 or 2     Social History     Substance and Sexual Activity   Drug Use Never     Social History     Tobacco Use   Smoking Status Former Smoker   Smokeless Tobacco Never Used   Tobacco Comment    quit 22 years ago      Family History   Problem Relation Age of Onset    Substance Abuse Family        Meds/Allergies       (Not in a hospital admission)    No Known Allergies    Objective     There were no vitals taken for this visit  PHYSICAL EXAM    There were no vitals taken for this visit  Gen: NAD  CV: RRR  CHEST: Clear  ABD: soft, NT/ND  EXT: no edema      ASSESSMENT/PLAN:  This is a 44y o  year old female here for colonoscopy, and she is stable and optimized for her procedure

## 2020-02-24 NOTE — ANESTHESIA POSTPROCEDURE EVALUATION
Post-Op Assessment Note    CV Status:  Stable       Mental Status:  Sleepy   Hydration Status:  Stable   PONV Controlled:  None   Airway Patency:  Patent   Post Op Vitals Reviewed: Yes      Staff: CRNA           /58 (02/24/20 0948)    Temp 97 6 °F (36 4 °C) (02/24/20 0948)    Pulse 80 (02/24/20 0948)   Resp 14 (02/24/20 0948)    SpO2 95 % (02/24/20 0948)    Post procedure VS noted above, SV non obstructed

## 2020-03-26 DIAGNOSIS — F41.9 ANXIETY: ICD-10-CM

## 2020-03-30 RX ORDER — DIAZEPAM 5 MG/1
TABLET ORAL
Qty: 30 TABLET | Refills: 1 | Status: SHIPPED | OUTPATIENT
Start: 2020-03-30 | End: 2021-08-11 | Stop reason: SDUPTHER

## 2020-04-09 ENCOUNTER — TELEPHONE (OUTPATIENT)
Dept: FAMILY MEDICINE CLINIC | Facility: CLINIC | Age: 50
End: 2020-04-09

## 2020-04-09 DIAGNOSIS — Z13.0 SCREENING FOR DEFICIENCY ANEMIA: Primary | ICD-10-CM

## 2020-04-09 DIAGNOSIS — Z11.4 SCREENING FOR HUMAN IMMUNODEFICIENCY VIRUS: ICD-10-CM

## 2020-04-09 DIAGNOSIS — Z13.1 SCREENING FOR DIABETES MELLITUS: ICD-10-CM

## 2020-04-09 DIAGNOSIS — Z13.29 SCREENING FOR THYROID DISORDER: ICD-10-CM

## 2020-04-09 DIAGNOSIS — E55.9 VITAMIN D DEFICIENCY: ICD-10-CM

## 2020-04-09 DIAGNOSIS — Z13.220 SCREENING FOR HYPERLIPIDEMIA: ICD-10-CM

## 2020-04-10 DIAGNOSIS — Z78.0 MENOPAUSE: Primary | ICD-10-CM

## 2020-07-14 ENCOUNTER — TELEPHONE (OUTPATIENT)
Dept: FAMILY MEDICINE CLINIC | Facility: CLINIC | Age: 50
End: 2020-07-14

## 2020-07-14 LAB
25(OH)D3+25(OH)D2 SERPL-MCNC: 76.4 NG/ML (ref 30–100)
ALBUMIN SERPL-MCNC: 5.3 G/DL (ref 3.8–4.9)
ALBUMIN/GLOB SERPL: 2 {RATIO} (ref 1.2–2.2)
ALP SERPL-CCNC: 145 IU/L (ref 39–117)
ALT SERPL-CCNC: 13 IU/L (ref 0–32)
AST SERPL-CCNC: 20 IU/L (ref 0–40)
BASOPHILS # BLD AUTO: 0 X10E3/UL (ref 0–0.2)
BASOPHILS NFR BLD AUTO: 0 %
BILIRUB SERPL-MCNC: 0.4 MG/DL (ref 0–1.2)
BUN SERPL-MCNC: 12 MG/DL (ref 6–24)
BUN/CREAT SERPL: 18 (ref 9–23)
CALCIUM SERPL-MCNC: 10.6 MG/DL (ref 8.7–10.2)
CHLORIDE SERPL-SCNC: 104 MMOL/L (ref 96–106)
CHOLEST SERPL-MCNC: 219 MG/DL (ref 100–199)
CO2 SERPL-SCNC: 18 MMOL/L (ref 20–29)
CREAT SERPL-MCNC: 0.66 MG/DL (ref 0.57–1)
EOSINOPHIL # BLD AUTO: 0 X10E3/UL (ref 0–0.4)
EOSINOPHIL NFR BLD AUTO: 0 %
ERYTHROCYTE [DISTWIDTH] IN BLOOD BY AUTOMATED COUNT: 13.9 % (ref 11.7–15.4)
ESTROGEN SERPL-MCNC: 127 PG/ML
FSH SERPL-ACNC: 78.6 MIU/ML
GLOBULIN SER-MCNC: 2.7 G/DL (ref 1.5–4.5)
GLUCOSE SERPL-MCNC: 117 MG/DL (ref 65–99)
HCT VFR BLD AUTO: 45.4 % (ref 34–46.6)
HDLC SERPL-MCNC: 91 MG/DL
HGB BLD-MCNC: 14.4 G/DL (ref 11.1–15.9)
HIV 1+2 AB+HIV1 P24 AG SERPL QL IA: NON REACTIVE
IMM GRANULOCYTES # BLD: 0 X10E3/UL (ref 0–0.1)
IMM GRANULOCYTES NFR BLD: 0 %
LDLC SERPL CALC-MCNC: 119 MG/DL (ref 0–99)
LH SERPL-ACNC: 57.8 MIU/ML
LYMPHOCYTES # BLD AUTO: 1 X10E3/UL (ref 0.7–3.1)
LYMPHOCYTES NFR BLD AUTO: 13 %
MCH RBC QN AUTO: 30.5 PG (ref 26.6–33)
MCHC RBC AUTO-ENTMCNC: 31.7 G/DL (ref 31.5–35.7)
MCV RBC AUTO: 96 FL (ref 79–97)
MONOCYTES # BLD AUTO: 0.2 X10E3/UL (ref 0.1–0.9)
MONOCYTES NFR BLD AUTO: 2 %
NEUTROPHILS # BLD AUTO: 6.1 X10E3/UL (ref 1.4–7)
NEUTROPHILS NFR BLD AUTO: 85 %
PLATELET # BLD AUTO: 348 X10E3/UL (ref 150–450)
POTASSIUM SERPL-SCNC: 5.6 MMOL/L (ref 3.5–5.2)
PROT SERPL-MCNC: 8 G/DL (ref 6–8.5)
RBC # BLD AUTO: 4.72 X10E6/UL (ref 3.77–5.28)
SL AMB EGFR AFRICAN AMERICAN: 118 ML/MIN/1.73
SL AMB EGFR NON AFRICAN AMERICAN: 103 ML/MIN/1.73
SL AMB VLDL CHOLESTEROL CALC: 9 MG/DL (ref 5–40)
SODIUM SERPL-SCNC: 143 MMOL/L (ref 134–144)
TESTOST SERPL-MCNC: 12.5 NG/DL
TRIGL SERPL-MCNC: 44 MG/DL (ref 0–149)
TSH SERPL DL<=0.005 MIU/L-ACNC: 1.23 UIU/ML (ref 0.45–4.5)
WBC # BLD AUTO: 7.4 X10E3/UL (ref 3.4–10.8)

## 2020-07-14 NOTE — TELEPHONE ENCOUNTER
Looks like she has serology ordered by other provider; GYN? Regarding my routine - there are some abnormalities which we can discuss at 8/4 visit if ok

## 2020-07-14 NOTE — TELEPHONE ENCOUNTER
Pt had to move her appointment to 8/4/20       She is asking about her lab results    Please advise    [facility]:[phone]

## 2020-07-16 ENCOUNTER — TELEPHONE (OUTPATIENT)
Dept: FAMILY MEDICINE CLINIC | Facility: CLINIC | Age: 50
End: 2020-07-16

## 2020-07-16 NOTE — TELEPHONE ENCOUNTER
Pt wants you to send her for a f/u MRI, which Dr Rosalina Dimsa ordered  The report said she was to get an mri again, but will not go back to Dr Rosalina Dimas, she doesn't like him, wants to see Dr Moe Mcmanus  She saw Lab work in My Chart and the were following something with BW, Has appt in Aug to dis BW with you  Wants this done before she comes back  But you never ordered the first one

## 2020-07-17 NOTE — TELEPHONE ENCOUNTER
S/W pt she is going to schedule appt with dr Virginia Martinez @ [facility], this who she wants to see

## 2020-07-17 NOTE — TELEPHONE ENCOUNTER
I am not sure who either provider is, [facility]? If they are of the same specialty; I would encourage the new provider order  Looking at the MRI - it was recommend that repeat be done in 6 months which has passed

## 2020-07-20 ENCOUNTER — TELEPHONE (OUTPATIENT)
Dept: GASTROENTEROLOGY | Facility: CLINIC | Age: 50
End: 2020-07-20

## 2020-07-20 NOTE — TELEPHONE ENCOUNTER
Pt of Morena Horn        Pt call ed she is over due for an MRI that has been delayed due to covid , she had her colon with Dr Morena Horn in 2/2020, was a patient of Dr Christopher Azar in [place] but now wants to continue with Dr Morena Horn    Please call when order is in she will schedule the MRI and F/u there after

## 2020-07-21 ENCOUNTER — TELEPHONE (OUTPATIENT)
Dept: GASTROENTEROLOGY | Facility: CLINIC | Age: 50
End: 2020-07-21

## 2020-07-21 NOTE — TELEPHONE ENCOUNTER
Please let the patient know that she does not need an MRI until October as previously recommended by Dr Fidelia Dunham

## 2020-07-22 NOTE — TELEPHONE ENCOUNTER
Left a voicemail that she is due in October for her MRI and an order can be put in for this and she needs to schedule an appointment with Dr Amauri Dennison  Advised if she has any additional questions to contact the office

## 2020-08-04 ENCOUNTER — OFFICE VISIT (OUTPATIENT)
Dept: FAMILY MEDICINE CLINIC | Facility: CLINIC | Age: 50
End: 2020-08-04
Payer: COMMERCIAL

## 2020-08-04 VITALS
DIASTOLIC BLOOD PRESSURE: 78 MMHG | TEMPERATURE: 98.1 F | OXYGEN SATURATION: 100 % | WEIGHT: 133.2 LBS | HEIGHT: 64 IN | HEART RATE: 64 BPM | SYSTOLIC BLOOD PRESSURE: 124 MMHG | BODY MASS INDEX: 22.74 KG/M2

## 2020-08-04 DIAGNOSIS — R73.01 ELEVATED FASTING GLUCOSE: Primary | ICD-10-CM

## 2020-08-04 DIAGNOSIS — K86.2 PANCREATIC CYST: ICD-10-CM

## 2020-08-04 DIAGNOSIS — R77.0 ABNORMAL ALBUMIN: ICD-10-CM

## 2020-08-04 DIAGNOSIS — R74.8 ELEVATED ALKALINE PHOSPHATASE LEVEL: ICD-10-CM

## 2020-08-04 PROCEDURE — 99213 OFFICE O/P EST LOW 20 MIN: CPT | Performed by: NURSE PRACTITIONER

## 2020-08-04 PROCEDURE — 1036F TOBACCO NON-USER: CPT | Performed by: NURSE PRACTITIONER

## 2020-08-04 PROCEDURE — 3725F SCREEN DEPRESSION PERFORMED: CPT | Performed by: NURSE PRACTITIONER

## 2020-08-04 PROCEDURE — 3008F BODY MASS INDEX DOCD: CPT | Performed by: NURSE PRACTITIONER

## 2020-08-04 RX ORDER — MELATONIN 5 MG
2 TABLET,CHEWABLE ORAL
COMMUNITY

## 2020-08-04 NOTE — PROGRESS NOTES
Assessment/Plan:    Problem List Items Addressed This Visit     Elevated alkaline phosphatase level    Relevant Orders    Comprehensive metabolic panel    Pancreatic cyst      Other Visit Diagnoses     Elevated fasting glucose    -  Primary    Relevant Orders    Comprehensive metabolic panel    Abnormal albumin        Relevant Orders    Comprehensive metabolic panel           Diagnoses and all orders for this visit:    Elevated fasting glucose  -     Comprehensive metabolic panel; Future    Elevated alkaline phosphatase level  -     Comprehensive metabolic panel; Future    Abnormal albumin  -     Comprehensive metabolic panel; Future    Pancreatic cyst    Other orders  -     Melatonin 5 MG CHEW; Chew 2 tablets daily at bedtime        No problem-specific Assessment & Plan notes found for this encounter  Subjective:      Patient ID: Kim Britton is a 44 y o  female  Kim Britton is here to discuss serology result from 7/9/2020  States that she seeing Omaira Avila for pancreatic cyst  States that she is to get Q6 month MRI to evaluate size of cyst  Was told that her alkaline phosphatase was elevated and recommend F/U in 3-6 months  Pt doing well overall, offers no acute complaints today  The following portions of the patient's history were reviewed and updated as appropriate:   She has a past medical history of Anxiety, Insomnia, Migraines, and Pancreas cyst ,  does not have any pertinent problems on file  ,   has a past surgical history that includes Breast surgery; Shoulder surgery (Right); Hernia repair; Shoulder surgery; and pr edg us exam surgical alter stom duodenum/jejunum (N/A, 4/10/2019)  ,  family history includes Substance Abuse in her family  ,   reports that she has quit smoking  She has never used smokeless tobacco  She reports current alcohol use  She reports that she does not use drugs  ,  has No Known Allergies     Current Outpatient Medications   Medication Sig Dispense Refill    Melatonin 5 MG CHEW Chew 2 tablets daily at bedtime      cyclobenzaprine (FLEXERIL) 10 mg tablet TAKE 1 TABLET BY MOUTH THREE TIMES A DAY AS NEEDED FOR MUSCLE SPASMS (Patient not taking: Reported on 8/4/2020) 21 tablet 0    diazepam (VALIUM) 5 mg tablet TAKE 1 TABLET BY MOUTH  DAILY AS NEEDED FOR ANXIETY (Patient not taking: Reported on 8/4/2020) 30 tablet 1    diphenhydrAMINE (BENADRYL) 25 mg capsule Take 25 mg by mouth every 6 (six) hours as needed for itching      fluticasone (FLONASE) 50 mcg/act nasal spray 2 sprays into each nostril daily      Linaclotide (LINZESS) 145 MCG CAPS Take 1 capsule (145 mcg total) by mouth daily Wait 1/2 hr before you eat (Patient not taking: Reported on 8/4/2020) 90 capsule 3    rizatriptan (MAXALT) 10 MG tablet Take 1 tablet (10 mg total) by mouth once as needed for migraine for up to 1 dose (Patient not taking: Reported on 8/4/2020) 9 tablet 5    zolpidem (AMBIEN) 10 mg tablet TAKE 1 TABLET BY MOUTH  DAILY AT BEDTIME AS NEEDED (Patient not taking: Reported on 8/4/2020) 30 tablet 3     No current facility-administered medications for this visit  Review of Systems   Constitutional: Negative  HENT: Negative  Eyes: Negative  Respiratory: Negative  Cardiovascular: Negative  Gastrointestinal: Negative  Endocrine: Negative  Genitourinary: Negative  Musculoskeletal: Negative  Skin: Negative  Allergic/Immunologic: Negative  Neurological: Negative  Hematological: Negative  Psychiatric/Behavioral: Negative  Objective:  Vitals:    08/04/20 1219   BP: 124/78   BP Location: Left arm   Patient Position: Sitting   Cuff Size: Standard   Pulse: 64   Temp: 98 1 °F (36 7 °C)   TempSrc: Tympanic   SpO2: 100%   Weight: 60 4 kg (133 lb 3 2 oz)   Height: 5' 3 5"     Body mass index is 23 23 kg/m²  Physical Exam   Constitutional: She is oriented to person, place, and time  She appears well-developed  She is cooperative  HENT:   Head: Normocephalic and atraumatic  Right Ear: Tympanic membrane, external ear and ear canal normal    Left Ear: Tympanic membrane, external ear and ear canal normal    Nose: Nose normal    Mouth/Throat: Uvula is midline  Eyes: Pupils are equal, round, and reactive to light  Conjunctivae and lids are normal    Neck: Trachea normal, normal range of motion, full passive range of motion without pain and phonation normal  Neck supple  No JVD present  No thyroid mass and no thyromegaly present  Cardiovascular: Normal rate, regular rhythm, S1 normal, S2 normal, normal heart sounds and normal pulses  Exam reveals no gallop and no friction rub  No murmur heard  Pulmonary/Chest: Effort normal and breath sounds normal  She has no decreased breath sounds  Abdominal: Soft  Normal appearance and bowel sounds are normal  There is no abdominal tenderness  No hernia  Genitourinary:    Genitourinary Comments: Deferred      Musculoskeletal: Normal range of motion  Neurological: She is alert and oriented to person, place, and time  She has normal reflexes  No cranial nerve deficit  Skin: Skin is warm and dry  Capillary refill takes less than 2 seconds  Psychiatric: Her speech is normal and behavior is normal  Judgment and thought content normal    Nursing note and vitals reviewed

## 2021-08-11 ENCOUNTER — OFFICE VISIT (OUTPATIENT)
Dept: INTERNAL MEDICINE CLINIC | Facility: CLINIC | Age: 51
End: 2021-08-11
Payer: COMMERCIAL

## 2021-08-11 VITALS
OXYGEN SATURATION: 97 % | BODY MASS INDEX: 22.53 KG/M2 | SYSTOLIC BLOOD PRESSURE: 112 MMHG | TEMPERATURE: 97.5 F | HEART RATE: 72 BPM | HEIGHT: 64 IN | WEIGHT: 132 LBS | RESPIRATION RATE: 18 BRPM | DIASTOLIC BLOOD PRESSURE: 70 MMHG

## 2021-08-11 DIAGNOSIS — Z13.0 SCREENING FOR DEFICIENCY ANEMIA: ICD-10-CM

## 2021-08-11 DIAGNOSIS — F41.9 ANXIETY: ICD-10-CM

## 2021-08-11 DIAGNOSIS — G47.9 SLEEP DISTURBANCE: ICD-10-CM

## 2021-08-11 DIAGNOSIS — Z00.00 WELL ADULT EXAM: Primary | ICD-10-CM

## 2021-08-11 DIAGNOSIS — Z13.220 SCREENING, LIPID: ICD-10-CM

## 2021-08-11 DIAGNOSIS — Z78.0 MENOPAUSE: ICD-10-CM

## 2021-08-11 DIAGNOSIS — Z28.21 INFLUENZA VACCINATION DECLINED: ICD-10-CM

## 2021-08-11 DIAGNOSIS — Z13.1 SCREENING FOR DIABETES MELLITUS: ICD-10-CM

## 2021-08-11 DIAGNOSIS — E55.9 VITAMIN D DEFICIENCY: ICD-10-CM

## 2021-08-11 DIAGNOSIS — Z13.29 SCREENING FOR THYROID DISORDER: ICD-10-CM

## 2021-08-11 DIAGNOSIS — K59.00 CONSTIPATION, UNSPECIFIED CONSTIPATION TYPE: ICD-10-CM

## 2021-08-11 PROCEDURE — 3008F BODY MASS INDEX DOCD: CPT | Performed by: PHYSICIAN ASSISTANT

## 2021-08-11 PROCEDURE — 3725F SCREEN DEPRESSION PERFORMED: CPT | Performed by: PHYSICIAN ASSISTANT

## 2021-08-11 PROCEDURE — 1036F TOBACCO NON-USER: CPT | Performed by: PHYSICIAN ASSISTANT

## 2021-08-11 PROCEDURE — 99396 PREV VISIT EST AGE 40-64: CPT | Performed by: PHYSICIAN ASSISTANT

## 2021-08-11 RX ORDER — ZOLPIDEM TARTRATE 10 MG/1
10 TABLET ORAL
Qty: 90 TABLET | Refills: 1 | Status: SHIPPED | OUTPATIENT
Start: 2021-08-11 | End: 2022-07-08

## 2021-08-11 RX ORDER — LINACLOTIDE 145 UG/1
1 CAPSULE, GELATIN COATED ORAL DAILY
Qty: 90 CAPSULE | Refills: 1 | Status: SHIPPED | OUTPATIENT
Start: 2021-08-11 | End: 2021-12-27

## 2021-08-11 RX ORDER — DIAZEPAM 5 MG/1
5 TABLET ORAL DAILY PRN
Qty: 90 TABLET | Refills: 1 | Status: SHIPPED | OUTPATIENT
Start: 2021-08-11 | End: 2022-07-08

## 2021-08-11 NOTE — PROGRESS NOTES
Assessment/Plan     Healthy female exam      1  Routine labs ordered  Covid vaccine education provided  Mammogram up to date and CRC Screening  2  Patient Counseling:  --Nutrition: Stressed importance of moderation in sodium/caffeine intake, saturated fat and cholesterol, caloric balance, sufficient intake of fresh fruits, vegetables, fiber, calcium, iron, and 1 mg of folate supplement per day (for females capable of pregnancy)  --Discussed the issue of estrogen replacement, calcium supplement, and the daily use of baby aspirin  --Exercise: Stressed the importance of regular exercise  --Substance Abuse: Discussed cessation/primary prevention of tobacco, alcohol, or other drug use; driving or other dangerous activities under the influence; availability of treatment for abuse  --Sexuality: Discussed sexually transmitted diseases, partner selection, use of condoms, avoidance of unintended pregnancy  and contraceptive alternatives  --Injury prevention: Discussed safety belts, safety helmets, smoke detector, smoking near bedding or upholstery  --Dental health: Discussed importance of regular tooth brushing, flossing, and dental visits  --Immunizations reviewed  --Discussed benefits of screening colonoscopy  --After hours service discussed with patient    3  Discussed the patient's BMI with her  The BMI is in the acceptable range  4  Follow up in one year  Subjective Colletta No is a 44 y o  female and is here for a comprehensive physical exam  The patient reports no problems  Do you take any herbs or supplements that were not prescribed by a doctor? no  Are you taking calcium supplements?  no  Are you taking aspirin daily? no        The following portions of the patient's history were reviewed and updated as appropriate: She  has a past medical history of Anxiety, Insomnia, Migraines, and Pancreas cyst   She   Patient Active Problem List    Diagnosis Date Noted    Screening for colon cancer 08/20/2019    Chronic left-sided low back pain with left-sided sciatica 08/20/2019    IPMN (intraductal papillary mucinous neoplasm) 04/24/2019    Heartburn 04/18/2019    Epigastric pain 04/18/2019    Intractable abdominal pain 04/11/2019    Constipation 02/21/2019    Elevated alkaline phosphatase level 02/21/2019    Pancreatic cyst 02/21/2019    Shoulder pain 01/09/2019    Influenza vaccination declined 12/19/2018    Anxiety 12/13/2017    Migraine 12/13/2017    Pain in both feet 12/13/2017    Sleep disturbances 12/13/2017    Vitamin D deficiency 12/13/2017    Chronic constipation 08/17/2017    Glenoid labrum tear 05/31/2017    Acute frontal sinusitis 02/29/2016    Fatigue 11/25/2015     She  has a past surgical history that includes Breast surgery; Shoulder surgery (Right); Hernia repair; Shoulder surgery; and pr edg us exam surgical alter stom duodenum/jejunum (N/A, 4/10/2019)  Her family history includes Alcohol abuse in her brother; Arthritis in her father and sister; Substance Abuse in her family  She  reports that she has quit smoking  She has never used smokeless tobacco  She reports current alcohol use  She reports that she does not use drugs    Current Outpatient Medications   Medication Sig Dispense Refill    diazepam (VALIUM) 5 mg tablet Take 1 tablet (5 mg total) by mouth daily as needed for anxiety 90 tablet 1    diphenhydrAMINE (BENADRYL) 25 mg capsule Take 25 mg by mouth every 6 (six) hours as needed for itching      fluticasone (FLONASE) 50 mcg/act nasal spray 2 sprays into each nostril daily      linaCLOtide (Linzess) 145 MCG CAPS Take 1 capsule (145 mcg total) by mouth daily Wait 1/2 hr before you eat 90 capsule 1    rizatriptan (MAXALT) 10 MG tablet Take 1 tablet (10 mg total) by mouth once as needed for migraine for up to 1 dose 9 tablet 5    zolpidem (AMBIEN) 10 mg tablet Take 1 tablet (10 mg total) by mouth daily at bedtime as needed for sleep 90 tablet 1    cyclobenzaprine (FLEXERIL) 10 mg tablet TAKE 1 TABLET BY MOUTH THREE TIMES A DAY AS NEEDED FOR MUSCLE SPASMS (Patient not taking: Reported on 8/4/2020) 21 tablet 0    Melatonin 5 MG CHEW Chew 2 tablets daily at bedtime (Patient not taking: Reported on 8/11/2021)       No current facility-administered medications for this visit  Current Outpatient Medications on File Prior to Visit   Medication Sig    diphenhydrAMINE (BENADRYL) 25 mg capsule Take 25 mg by mouth every 6 (six) hours as needed for itching    fluticasone (FLONASE) 50 mcg/act nasal spray 2 sprays into each nostril daily    rizatriptan (MAXALT) 10 MG tablet Take 1 tablet (10 mg total) by mouth once as needed for migraine for up to 1 dose    [DISCONTINUED] diazepam (VALIUM) 5 mg tablet TAKE 1 TABLET BY MOUTH  DAILY AS NEEDED FOR ANXIETY    [DISCONTINUED] Linaclotide (LINZESS) 145 MCG CAPS Take 1 capsule (145 mcg total) by mouth daily Wait 1/2 hr before you eat    [DISCONTINUED] zolpidem (AMBIEN) 10 mg tablet TAKE 1 TABLET BY MOUTH  DAILY AT BEDTIME AS NEEDED    cyclobenzaprine (FLEXERIL) 10 mg tablet TAKE 1 TABLET BY MOUTH THREE TIMES A DAY AS NEEDED FOR MUSCLE SPASMS (Patient not taking: Reported on 8/4/2020)    Melatonin 5 MG CHEW Chew 2 tablets daily at bedtime (Patient not taking: Reported on 8/11/2021)     No current facility-administered medications on file prior to visit  She has No Known Allergies       Review of Systems  Do you have pain that bothers you in your daily life? no  Constitutional: negative  Ears, nose, mouth, throat, and face: negative  Respiratory: negative  Cardiovascular: negative  Gastrointestinal: negative  Integument/breast: negative  Hematologic/lymphatic: negative  Musculoskeletal:negative  Neurological: negative  Behavioral/Psych: negative      Objective     /70 (BP Location: Left arm, Patient Position: Sitting, Cuff Size: Adult)   Pulse 72   Temp 97 5 °F (36 4 °C) (Temporal)   Resp 18   Ht 5' 3 5" (1 613 m)   Wt 59 9 kg (132 lb)   SpO2 97%   BMI 23 02 kg/m²     General Appearance:    Alert, cooperative, no distress, appears stated age   Head:    Normocephalic, without obvious abnormality, atraumatic   Eyes:    PERRL, conjunctiva/corneas clear, EOM's intact, fundi     benign, both eyes   Ears:    Normal TM's and external ear canals, both ears   Nose:   Nares normal, septum midline, mucosa normal, no drainage    or sinus tenderness   Throat:   Lips, mucosa, and tongue normal; teeth and gums normal   Neck:   Supple, symmetrical, trachea midline, no adenopathy;     thyroid:  no enlargement/tenderness/nodules; no carotid    bruit or JVD   Back:     Symmetric, no curvature, ROM normal, no CVA tenderness   Lungs:     Clear to auscultation bilaterally, respirations unlabored   Chest Wall:    No tenderness or deformity    Heart:    Regular rate and rhythm, S1 and S2 normal, no murmur, rub   or gallop   Breast Exam:    No tenderness, masses, or nipple abnormality   Abdomen:     Soft, non-tender, bowel sounds active all four quadrants,     no masses, no organomegaly   Genitalia:    Normal female without lesion, discharge or tenderness   Rectal:    Normal tone, no masses or tenderness; guaiac negative stool   Extremities:   Extremities normal, atraumatic, no cyanosis or edema   Pulses:   2+ and symmetric all extremities   Skin:   Skin color, texture, turgor normal, no rashes or lesions   Lymph nodes:   Cervical, supraclavicular, and axillary nodes normal   Neurologic:   CNII-XII intact, normal strength, sensation and reflexes     throughout

## 2021-08-16 ENCOUNTER — TELEPHONE (OUTPATIENT)
Dept: INTERNAL MEDICINE CLINIC | Facility: CLINIC | Age: 51
End: 2021-08-16

## 2022-07-07 DIAGNOSIS — F41.9 ANXIETY: ICD-10-CM

## 2022-07-07 DIAGNOSIS — G47.9 SLEEP DISTURBANCE: ICD-10-CM

## 2022-07-08 DIAGNOSIS — G43.001 MIGRAINE WITHOUT AURA AND WITH STATUS MIGRAINOSUS, NOT INTRACTABLE: ICD-10-CM

## 2022-07-08 RX ORDER — ZOLPIDEM TARTRATE 10 MG/1
TABLET ORAL
Qty: 60 TABLET | Refills: 0 | Status: SHIPPED | OUTPATIENT
Start: 2022-07-08

## 2022-07-08 RX ORDER — DIAZEPAM 5 MG/1
TABLET ORAL
Qty: 90 TABLET | Refills: 0 | Status: SHIPPED | OUTPATIENT
Start: 2022-07-08

## 2022-07-08 RX ORDER — RIZATRIPTAN BENZOATE 10 MG/1
10 TABLET ORAL ONCE AS NEEDED
Qty: 27 TABLET | Refills: 0 | Status: SHIPPED | OUTPATIENT
Start: 2022-07-08

## 2022-08-05 ENCOUNTER — RA CDI HCC (OUTPATIENT)
Dept: OTHER | Facility: HOSPITAL | Age: 52
End: 2022-08-05

## 2022-08-05 NOTE — PROGRESS NOTES
[facility]  coding opportunities       Chart reviewed, no opportunity found: CHART REVIEWED, NO OPPORTUNITY FOUND        Patients Insurance        Commercial Insurance: [employer]uk[employer] Mor[employer]

## 2022-09-19 ENCOUNTER — OFFICE VISIT (OUTPATIENT)
Dept: INTERNAL MEDICINE CLINIC | Facility: CLINIC | Age: 52
End: 2022-09-19
Payer: COMMERCIAL

## 2022-09-19 VITALS
TEMPERATURE: 97.6 F | HEIGHT: 64 IN | DIASTOLIC BLOOD PRESSURE: 62 MMHG | SYSTOLIC BLOOD PRESSURE: 118 MMHG | HEART RATE: 59 BPM | WEIGHT: 130.13 LBS | OXYGEN SATURATION: 100 % | BODY MASS INDEX: 22.22 KG/M2

## 2022-09-19 DIAGNOSIS — Z13.0 SCREENING FOR DEFICIENCY ANEMIA: ICD-10-CM

## 2022-09-19 DIAGNOSIS — Z13.220 SCREENING, LIPID: ICD-10-CM

## 2022-09-19 DIAGNOSIS — U07.1 COVID-19: Primary | ICD-10-CM

## 2022-09-19 DIAGNOSIS — E55.9 VITAMIN D DEFICIENCY: ICD-10-CM

## 2022-09-19 DIAGNOSIS — Z13.1 SCREENING FOR DIABETES MELLITUS: ICD-10-CM

## 2022-09-19 DIAGNOSIS — Z13.29 SCREENING FOR THYROID DISORDER: ICD-10-CM

## 2022-09-19 PROBLEM — R10.13 EPIGASTRIC PAIN: Status: RESOLVED | Noted: 2019-04-18 | Resolved: 2022-09-19

## 2022-09-19 PROBLEM — M79.672 PAIN IN BOTH FEET: Status: RESOLVED | Noted: 2017-12-13 | Resolved: 2022-09-19

## 2022-09-19 PROBLEM — M79.671 PAIN IN BOTH FEET: Status: RESOLVED | Noted: 2017-12-13 | Resolved: 2022-09-19

## 2022-09-19 PROBLEM — M25.519 SHOULDER PAIN: Status: RESOLVED | Noted: 2019-01-09 | Resolved: 2022-09-19

## 2022-09-19 PROCEDURE — 3725F SCREEN DEPRESSION PERFORMED: CPT | Performed by: PHYSICIAN ASSISTANT

## 2022-09-19 PROCEDURE — 99213 OFFICE O/P EST LOW 20 MIN: CPT | Performed by: PHYSICIAN ASSISTANT

## 2022-09-19 RX ORDER — BENZONATATE 200 MG/1
200 CAPSULE ORAL 3 TIMES DAILY PRN
Qty: 20 CAPSULE | Refills: 0 | Status: SHIPPED | OUTPATIENT
Start: 2022-09-19

## 2022-09-19 NOTE — PROGRESS NOTES
Name: Aileen Mejia      : 1970      MRN: [id number]  Encounter Provider: Edgar Richards PA-C  Encounter Date: 2022   Encounter department: [address] [place] [address]  COVID-19  Assessment & Plan:  Improving but not yet resolved  Start tessalon perrles for cough  Other supportive treatment strategies discussed  Consider vaccination  Update labs  Orders:  -     benzonatate (TESSALON) 200 MG capsule; Take 1 capsule (200 mg total) by mouth 3 (three) times a day as needed for cough    2  Vitamin D deficiency  -     Vitamin D 25 hydroxy; Future    3  Screening, lipid  -     Lipid panel; Future    4  Screening for diabetes mellitus  -     Comprehensive metabolic panel; Future    5  Screening for thyroid disorder  -     TSH, 3rd generation; Future    6  Screening for deficiency anemia  -     CBC and differential; Future        Depression Screening and Follow-up Plan: Patient was screened for depression during today's encounter  They screened negative with a PHQ-2 score of 0  Subjective      Pt presents for routine visit  She is getting over recent covid 19 infection  She notes ongoing cough, congestion and fatigue  She is overall feeling better than she did last week  Covid vaccination discussed  She is due for labs  Mammogram and CRC screening are up to date  All vitals are normal      Review of Systems   Constitutional: Positive for fatigue  Negative for chills and fever  HENT: Positive for congestion  Negative for ear pain, hearing loss, postnasal drip, rhinorrhea, sinus pressure, sinus pain, sore throat and trouble swallowing  Eyes: Negative for pain and visual disturbance  Respiratory: Positive for cough  Negative for chest tightness, shortness of breath and wheezing  Cardiovascular: Negative  Negative for chest pain, palpitations and leg swelling     Gastrointestinal: Negative for abdominal pain, blood in stool, constipation, diarrhea, nausea and vomiting  Endocrine: Negative for cold intolerance, heat intolerance, polydipsia, polyphagia and polyuria  Genitourinary: Negative for difficulty urinating, dysuria, flank pain and urgency  Musculoskeletal: Negative for arthralgias, back pain, gait problem and myalgias  Skin: Negative for rash  Allergic/Immunologic: Negative  Neurological: Negative for dizziness, weakness, light-headedness and headaches  Hematological: Negative  Psychiatric/Behavioral: Negative for behavioral problems, dysphoric mood and sleep disturbance  The patient is not nervous/anxious  Current Outpatient Medications on File Prior to Visit   Medication Sig    diazepam (VALIUM) 5 mg tablet TAKE 1 TABLET BY MOUTH  DAILY AS NEEDED FOR ANXIETY    diphenhydrAMINE (BENADRYL) 25 mg capsule Take 25 mg by mouth every 6 (six) hours as needed for itching    fluticasone (FLONASE) 50 mcg/act nasal spray 2 sprays into each nostril daily    Linzess 145 MCG CAPS TAKE 1 CAPSULE BY MOUTH  DAILY WAIT 1/2 HOURS BEFORE YOU EAT    rizatriptan (MAXALT) 10 mg tablet Take 1 tablet (10 mg total) by mouth once as needed for migraine for up to 1 dose    zolpidem (AMBIEN) 10 mg tablet TAKE 1 TABLET BY MOUTH  DAILY AT BEDTIME AS NEEDED  FOR SLEEP    [DISCONTINUED] cyclobenzaprine (FLEXERIL) 10 mg tablet TAKE 1 TABLET BY MOUTH THREE TIMES A DAY AS NEEDED FOR MUSCLE SPASMS (Patient not taking: No sig reported)    [DISCONTINUED] Melatonin 5 MG CHEW Chew 2 tablets daily at bedtime (Patient not taking: No sig reported)       Objective     /62 (BP Location: Left arm, Patient Position: Sitting)   Pulse 59   Temp 97 6 °F (36 4 °C)   Ht 5' 3 5" (1 613 m)   Wt 59 kg (130 lb 2 oz)   SpO2 100%   BMI 22 69 kg/m²     Physical Exam  Nursing note reviewed  Constitutional:       Appearance: She is well-developed  HENT:      Head: Normocephalic and atraumatic  Nose: Congestion present     Cardiovascular:      Rate and Rhythm: Normal rate and regular rhythm  Pulses: Normal pulses  Heart sounds: Normal heart sounds  Pulmonary:      Effort: Pulmonary effort is normal       Breath sounds: Normal breath sounds  Musculoskeletal:         General: Normal range of motion  Cervical back: Normal range of motion  Neurological:      Mental Status: She is alert  Psychiatric:         Behavior: Behavior normal          Thought Content:  Thought content normal          Judgment: Judgment normal        Ivone Angela PA-C

## 2022-09-19 NOTE — ASSESSMENT & PLAN NOTE
Improving but not yet resolved  Start tessalon perrles for cough  Other supportive treatment strategies discussed  Consider vaccination  Update labs

## 2023-07-25 DIAGNOSIS — G47.9 SLEEP DISTURBANCE: ICD-10-CM

## 2023-07-25 DIAGNOSIS — F41.9 ANXIETY: ICD-10-CM

## 2023-07-25 DIAGNOSIS — G43.001 MIGRAINE WITHOUT AURA AND WITH STATUS MIGRAINOSUS, NOT INTRACTABLE: ICD-10-CM

## 2023-07-25 RX ORDER — RIZATRIPTAN BENZOATE 10 MG/1
TABLET ORAL
Qty: 27 TABLET | Refills: 0 | Status: SHIPPED | OUTPATIENT
Start: 2023-07-25

## 2023-07-26 RX ORDER — ZOLPIDEM TARTRATE 10 MG/1
TABLET ORAL
Qty: 60 TABLET | Refills: 0 | Status: SHIPPED | OUTPATIENT
Start: 2023-07-26

## 2023-07-26 RX ORDER — DIAZEPAM 5 MG/1
TABLET ORAL
Qty: 90 TABLET | Refills: 0 | Status: SHIPPED | OUTPATIENT
Start: 2023-07-26

## 2023-09-11 NOTE — ANESTHESIA PREPROCEDURE EVALUATION
Review of Systems/Medical History  Patient summary reviewed  Chart reviewed      Cardiovascular  Negative cardio ROS    Pulmonary  Negative pulmonary ROS        GI/Hepatic  Negative GI/hepatic ROS          Negative  ROS        Endo/Other  Negative endo/other ROS   Comment: H/o pancreatic cyst    GYN  Negative gynecology ROS          Hematology  Negative hematology ROS      Musculoskeletal  Negative musculoskeletal ROS        Neurology    Headaches,    Psychology   Anxiety,              Physical Exam    Airway    Mallampati score: II  TM Distance: >3 FB  Neck ROM: full     Dental   No notable dental hx     Cardiovascular  Comment: Negative ROS, Rhythm: regular, Rate: normal, Cardiovascular exam normal    Pulmonary  Pulmonary exam normal Breath sounds clear to auscultation,     Other Findings        Anesthesia Plan  ASA Score- 2     Anesthesia Type- IV sedation with anesthesia with ASA Monitors  Additional Monitors:   Airway Plan:         Plan Factors-    Induction- intravenous  Postoperative Plan- Plan for postoperative opioid use  Informed Consent- Anesthetic plan and risks discussed with patient  I personally reviewed this patient with the CRNA  Discussed and agreed on the Anesthesia Plan with the CRNA  Herminai Muhammad abdominal pain

## 2023-09-14 ENCOUNTER — RA CDI HCC (OUTPATIENT)
Dept: OTHER | Facility: HOSPITAL | Age: 53
End: 2023-09-14

## 2023-09-14 NOTE — PROGRESS NOTES
[address] [facility] coding opportunities       Chart reviewed, no opportunity found: CHART REVIEWED, NO OPPORTUNITY FOUND        Patients Insurance        Commercial Insurance: [employer]

## 2023-09-15 ENCOUNTER — TELEPHONE (OUTPATIENT)
Dept: INTERNAL MEDICINE CLINIC | Facility: CLINIC | Age: 53
End: 2023-09-15

## 2023-09-15 DIAGNOSIS — Z13.29 SCREENING FOR THYROID DISORDER: ICD-10-CM

## 2023-09-15 DIAGNOSIS — Z13.0 SCREENING FOR DEFICIENCY ANEMIA: ICD-10-CM

## 2023-09-15 DIAGNOSIS — Z13.1 SCREENING FOR DIABETES MELLITUS: ICD-10-CM

## 2023-09-15 DIAGNOSIS — Z13.220 SCREENING, LIPID: ICD-10-CM

## 2023-09-15 DIAGNOSIS — E55.9 VITAMIN D DEFICIENCY: Primary | ICD-10-CM

## 2023-09-15 NOTE — TELEPHONE ENCOUNTER
I called patient to reschedule appointment on 9/21. She is asking if you can put in the orders for her Routine labs for her to get done in the meantime as she likes to stay on track with those. Please advise. patient would like these emailed to her once they are placed. [email or web address]

## 2023-10-31 ENCOUNTER — APPOINTMENT (OUTPATIENT)
Dept: RADIOLOGY | Facility: CLINIC | Age: 53
End: 2023-10-31
Payer: COMMERCIAL

## 2023-10-31 ENCOUNTER — OFFICE VISIT (OUTPATIENT)
Dept: INTERNAL MEDICINE CLINIC | Facility: CLINIC | Age: 53
End: 2023-10-31
Payer: COMMERCIAL

## 2023-10-31 VITALS
WEIGHT: 125.2 LBS | TEMPERATURE: 97.5 F | HEIGHT: 64 IN | SYSTOLIC BLOOD PRESSURE: 114 MMHG | HEART RATE: 67 BPM | OXYGEN SATURATION: 99 % | BODY MASS INDEX: 21.37 KG/M2 | DIASTOLIC BLOOD PRESSURE: 72 MMHG

## 2023-10-31 DIAGNOSIS — Z12.4 SCREENING FOR CERVICAL CANCER: ICD-10-CM

## 2023-10-31 DIAGNOSIS — M54.2 NECK PAIN: ICD-10-CM

## 2023-10-31 DIAGNOSIS — M54.2 NECK PAIN: Primary | ICD-10-CM

## 2023-10-31 DIAGNOSIS — Z23 ENCOUNTER FOR IMMUNIZATION: ICD-10-CM

## 2023-10-31 PROBLEM — R10.9 INTRACTABLE ABDOMINAL PAIN: Status: RESOLVED | Noted: 2019-04-11 | Resolved: 2023-10-31

## 2023-10-31 PROBLEM — K59.00 CONSTIPATION: Status: RESOLVED | Noted: 2019-02-21 | Resolved: 2023-10-31

## 2023-10-31 PROBLEM — M54.42 CHRONIC LEFT-SIDED LOW BACK PAIN WITH LEFT-SIDED SCIATICA: Status: RESOLVED | Noted: 2019-08-20 | Resolved: 2023-10-31

## 2023-10-31 PROBLEM — R74.8 ELEVATED ALKALINE PHOSPHATASE LEVEL: Status: RESOLVED | Noted: 2019-02-21 | Resolved: 2023-10-31

## 2023-10-31 PROBLEM — R12 HEARTBURN: Status: RESOLVED | Noted: 2019-04-18 | Resolved: 2023-10-31

## 2023-10-31 PROBLEM — K59.09 CHRONIC CONSTIPATION: Status: RESOLVED | Noted: 2017-08-17 | Resolved: 2023-10-31

## 2023-10-31 PROBLEM — G89.29 CHRONIC LEFT-SIDED LOW BACK PAIN WITH LEFT-SIDED SCIATICA: Status: RESOLVED | Noted: 2019-08-20 | Resolved: 2023-10-31

## 2023-10-31 PROCEDURE — 3725F SCREEN DEPRESSION PERFORMED: CPT | Performed by: PHYSICIAN ASSISTANT

## 2023-10-31 PROCEDURE — 99213 OFFICE O/P EST LOW 20 MIN: CPT | Performed by: PHYSICIAN ASSISTANT

## 2023-10-31 PROCEDURE — 72050 X-RAY EXAM NECK SPINE 4/5VWS: CPT

## 2023-10-31 RX ORDER — LOTEPREDNOL ETABONATE 3.8 MG/G
1 GEL OPHTHALMIC 3 TIMES DAILY
COMMUNITY
Start: 2023-08-01

## 2023-10-31 RX ORDER — HYDROCORTISONE VALERATE CREAM 2 MG/G
CREAM TOPICAL
COMMUNITY
Start: 2023-10-03

## 2023-10-31 RX ORDER — CYCLOBENZAPRINE HCL 10 MG
1 TABLET ORAL 3 TIMES DAILY
COMMUNITY

## 2023-10-31 NOTE — PROGRESS NOTES
Name: Galen Mtaa      : 1970      MRN: [id number]  Encounter Provider: Raquel Pink PA-C  Encounter Date: 10/31/2023   Encounter department: [id number] [address]     1. Neck pain  Assessment & Plan:  No injury. Improved since onset but not resolved. Will get xray to better evaluate. Orders:  -     XR spine cervical complete 4 or 5 vw non injury; Future; Expected date: 10/31/2023    2. Encounter for immunization    3. Screening for cervical cancer  -     Ambulatory referral to Obstetrics / Gynecology; Future        Depression Screening and Follow-up Plan: Patient was screened for depression during today's encounter. They screened negative with a PHQ-2 score of 0. Subjective      Neck Pain   This is a recurrent problem. The current episode started more than 1 month ago. The problem occurs intermittently. The problem has been gradually improving. The pain is associated with nothing. The pain is present in the occipital region. The quality of the pain is described as aching. The pain is at a severity of 3/10. The pain is mild. The symptoms are aggravated by bending and position. Pertinent negatives include no chest pain, fever, headaches, numbness, pain with swallowing, photophobia, syncope, tingling, trouble swallowing, visual change, weakness or weight loss. She has tried NSAIDs for the symptoms. The treatment provided mild relief. Review of Systems   Constitutional:  Negative for chills, fever and weight loss. HENT:  Negative for congestion, ear pain, hearing loss, postnasal drip, rhinorrhea, sinus pressure, sinus pain, sore throat and trouble swallowing. Eyes:  Negative for photophobia, pain and visual disturbance. Respiratory:  Negative for cough, chest tightness, shortness of breath and wheezing. Cardiovascular: Negative. Negative for chest pain, palpitations, leg swelling and syncope.    Gastrointestinal:  Negative for abdominal pain, blood in stool, constipation, diarrhea, nausea and vomiting. Endocrine: Negative for cold intolerance, heat intolerance, polydipsia, polyphagia and polyuria. Genitourinary:  Negative for difficulty urinating, dysuria, flank pain and urgency. Musculoskeletal:  Positive for neck pain. Negative for arthralgias, back pain, gait problem and myalgias. Skin:  Negative for rash. Allergic/Immunologic: Negative. Neurological:  Negative for dizziness, tingling, weakness, light-headedness, numbness and headaches. Hematological: Negative. Psychiatric/Behavioral:  Negative for behavioral problems, dysphoric mood and sleep disturbance. The patient is not nervous/anxious.         Current Outpatient Medications on File Prior to Visit   Medication Sig   • diazepam (VALIUM) 5 mg tablet TAKE 1 TABLET BY MOUTH  DAILY AS NEEDED FOR ANXIETY   • diphenhydrAMINE (BENADRYL) 25 mg capsule Take 25 mg by mouth every 6 (six) hours as needed for itching   • fluticasone (FLONASE) 50 mcg/act nasal spray 2 sprays into each nostril daily   • hydrocortisone (WESTCORT) 0.2 % cream    • Lotemax SM 0.38 % GEL Administer 1 drop to both eyes 3 (three) times a day   • rizatriptan (MAXALT) 10 mg tablet TAKE 1 TABLET BY MOUTH 1  TIME AS NEEDED FOR MIGRAINE FOR UP TO 1 DOSE   • zolpidem (AMBIEN) 10 mg tablet TAKE 1 TABLET BY MOUTH  DAILY AT BEDTIME AS NEEDED  FOR SLEEP   • cyclobenzaprine (FLEXERIL) 10 mg tablet Take 1 tablet by mouth 3 (three) times a day   • [DISCONTINUED] benzonatate (TESSALON) 200 MG capsule Take 1 capsule (200 mg total) by mouth 3 (three) times a day as needed for cough (Patient not taking: Reported on 10/31/2023)   • [DISCONTINUED] Linzess 145 MCG CAPS TAKE 1 CAPSULE BY MOUTH  DAILY WAIT 1/2 HOURS BEFORE YOU EAT (Patient not taking: Reported on 10/31/2023)       Objective     /72   Pulse 67   Temp 97.5 °F (36.4 °C)   Ht 5' 4" (1.626 m)   Wt 56.8 kg (125 lb 3.2 oz)   SpO2 99%   BMI 21.49 kg/m²     Physical Exam  Vitals and nursing note reviewed. Constitutional:       General: She is not in acute distress. Appearance: Normal appearance. She is well-developed. She is not diaphoretic. HENT:      Head: Normocephalic and atraumatic. Right Ear: External ear normal.      Left Ear: External ear normal.      Nose: Nose normal.      Mouth/Throat:      Pharynx: No oropharyngeal exudate. Eyes:      General: No scleral icterus. Right eye: No discharge. Left eye: No discharge. Conjunctiva/sclera: Conjunctivae normal.      Pupils: Pupils are equal, round, and reactive to light. Neck:      Thyroid: No thyromegaly. Cardiovascular:      Rate and Rhythm: Normal rate and regular rhythm. Heart sounds: Normal heart sounds. No murmur heard. No friction rub. No gallop. Pulmonary:      Effort: Pulmonary effort is normal. No respiratory distress. Breath sounds: Normal breath sounds. No wheezing or rales. Abdominal:      General: Bowel sounds are normal. There is no distension. Palpations: Abdomen is soft. Tenderness: There is no abdominal tenderness. Musculoskeletal:         General: No tenderness or deformity. Normal range of motion. Cervical back: Normal range of motion and neck supple. Skin:     General: Skin is warm and dry. Neurological:      Mental Status: She is alert and oriented to person, place, and time. Cranial Nerves: No cranial nerve deficit. Psychiatric:         Behavior: Behavior normal.         Thought Content:  Thought content normal.         Judgment: Judgment normal.       Ivone Angela PA-C

## 2023-11-10 NOTE — RESULT ENCOUNTER NOTE
Antonio Markham  Your neck xray reveals some disc disease at the lower neck, but otherwise looks good. I would recommend trying some anti-inflammatory medication like Aleve on days that it is bothersome.  Let me know if it isnt improving!   -Roma Araujo

## 2024-02-21 PROBLEM — Z12.11 SCREENING FOR COLON CANCER: Status: RESOLVED | Noted: 2019-08-20 | Resolved: 2024-02-21

## 2024-03-28 DIAGNOSIS — G43.001 MIGRAINE WITHOUT AURA AND WITH STATUS MIGRAINOSUS, NOT INTRACTABLE: ICD-10-CM

## 2024-03-28 RX ORDER — RIZATRIPTAN BENZOATE 10 MG/1
TABLET ORAL
Qty: 90 TABLET | Refills: 1 | Status: SHIPPED | OUTPATIENT
Start: 2024-03-28

## 2024-04-02 ENCOUNTER — TELEPHONE (OUTPATIENT)
Dept: INTERNAL MEDICINE CLINIC | Facility: CLINIC | Age: 54
End: 2024-04-02

## 2024-04-02 NOTE — TELEPHONE ENCOUNTER
Patient called stating there is a problem with her Maxalt prescription.  Order is being held up by us.  Patient requesting us to call [facility] or patient to clarify what problem is with the prescription.

## 2024-04-19 ENCOUNTER — TELEPHONE (OUTPATIENT)
Age: 54
End: 2024-04-19

## 2024-04-19 NOTE — TELEPHONE ENCOUNTER
Pt calling for a NP appt at [facility] office    Advised Dr Zendejas is booked through May, and to call us next month to check on her avail starting in June

## 2024-06-16 DIAGNOSIS — M54.2 NECK PAIN: Primary | ICD-10-CM

## 2024-06-17 RX ORDER — CYCLOBENZAPRINE HCL 10 MG
10 TABLET ORAL 3 TIMES DAILY
Qty: 90 TABLET | Refills: 1 | Status: SHIPPED | OUTPATIENT
Start: 2024-06-17

## 2024-06-19 ENCOUNTER — APPOINTMENT (EMERGENCY)
Dept: CT IMAGING | Facility: HOSPITAL | Age: 54
End: 2024-06-19
Payer: COMMERCIAL

## 2024-06-19 ENCOUNTER — HOSPITAL ENCOUNTER (EMERGENCY)
Facility: HOSPITAL | Age: 54
Discharge: HOME | End: 2024-06-19
Attending: EMERGENCY MEDICINE
Payer: COMMERCIAL

## 2024-06-19 VITALS
OXYGEN SATURATION: 100 % | TEMPERATURE: 98.2 F | RESPIRATION RATE: 16 BRPM | HEART RATE: 75 BPM | DIASTOLIC BLOOD PRESSURE: 79 MMHG | SYSTOLIC BLOOD PRESSURE: 116 MMHG

## 2024-06-19 DIAGNOSIS — M51.37 DEGENERATIVE DISC DISEASE AT L5-S1 LEVEL: ICD-10-CM

## 2024-06-19 DIAGNOSIS — M54.16 LEFT LUMBAR RADICULOPATHY: Primary | ICD-10-CM

## 2024-06-19 PROCEDURE — 99284 EMERGENCY DEPT VISIT MOD MDM: CPT | Performed by: EMERGENCY MEDICINE

## 2024-06-19 PROCEDURE — 72131 CT LUMBAR SPINE W/O DYE: CPT

## 2024-06-19 PROCEDURE — 99284 EMERGENCY DEPT VISIT MOD MDM: CPT

## 2024-06-19 RX ORDER — CELECOXIB 100 MG/1
200 CAPSULE ORAL ONCE
Status: COMPLETED | OUTPATIENT
Start: 2024-06-19 | End: 2024-06-19

## 2024-06-19 RX ORDER — LIDOCAINE 50 MG/G
1 PATCH TOPICAL ONCE
Status: DISCONTINUED | OUTPATIENT
Start: 2024-06-19 | End: 2024-06-19 | Stop reason: HOSPADM

## 2024-06-19 RX ORDER — ACETAMINOPHEN 325 MG/1
975 TABLET ORAL ONCE
Status: COMPLETED | OUTPATIENT
Start: 2024-06-19 | End: 2024-06-19

## 2024-06-19 RX ORDER — CELECOXIB 200 MG/1
CAPSULE ORAL
Qty: 30 CAPSULE | Refills: 0 | Status: SHIPPED | OUTPATIENT
Start: 2024-06-19 | End: 2024-06-28

## 2024-06-19 RX ORDER — PREDNISONE 10 MG/1
TABLET ORAL
Qty: 28 TABLET | Refills: 0 | Status: SHIPPED | OUTPATIENT
Start: 2024-06-19 | End: 2024-06-27

## 2024-06-19 RX ADMIN — CELECOXIB 200 MG: 100 CAPSULE ORAL at 08:34

## 2024-06-19 RX ADMIN — ACETAMINOPHEN 975 MG: 325 TABLET, FILM COATED ORAL at 08:34

## 2024-06-19 RX ADMIN — LIDOCAINE 1 PATCH: 50 PATCH TOPICAL at 08:34

## 2024-06-19 NOTE — DISCHARGE INSTRUCTIONS
Use the prescribed Celebrex, 1 tablet every 12-24 hours as needed for mild to moderate pain.  Take with food and not on an empty stomach.  Avoid use with other NSAIDs such as ibuprofen or Aleve.  You may add on Exer strength Tylenol every 4-6 hours as needed for additional pain relief.  Also recommend over-the-counter lidocaine or menthol patches.  Keep the patch on for 12 hours, then remove and leave off for 12 hours prior to placing a new patch.  Also recommend heating pad on and off, gentle massage, light stretching.  Return to the ER immediately if your pain significantly worsens, if you have new numbness or weakness of the leg not going away, difficulty urinating or urinary incontinence, or any other new concerning symptoms.

## 2024-06-19 NOTE — ED PROVIDER NOTES
"History  Chief Complaint   Patient presents with    Back Pain     States \"its my sciatica I think\". Chronic sciatic issues. States she has been taking gabapentin and flexeril but nothing has been working. Ambulatory to treatment room.       Patient is a 53-year-old female with past medical history of anxiety, migraines, history of sciatica, presents to the emergency department for acute low back pain radiating into the left leg that started over the weekend.  Patient states she had a mechanical fall on 6/4 in which she rolled her ankle but she does report landing on her left side.  She denies having any back injury at that time but over the weekend she started to feel worsening low back pain that now radiates into the left buttocks and left posterior thigh to the level of the knee/proximal calf.  She states it feels similar to sciatica but has never been this bad before.  Pain is worse when she first wakes up.  She has been taking Advil, last dose this morning.  She also took Flexeril which normally helps her sciatica pain but that did not help over the weekend.  She then took 300 mg of her husbands gabapentin which took the edge off.  She denies any new weakness of the left leg.  She states intermittent tingling sensation that comes and goes.  She denies any difficulty urinating, urinary retention or incontinence, fecal retention or incontinence.  Denies any fevers, chills, saddle anesthesia, abdominal or pelvic pain, nausea, vomiting, change in bowel habits, chest pain, palpitations, dyspnea.  Rest of review of systems is negative.      History provided by:  Patient   used: No    Back Pain  Associated symptoms: no abdominal pain, no chest pain, no dysuria, no fever, no headaches, no numbness, no pelvic pain and no weakness        Prior to Admission Medications   Prescriptions Last Dose Informant Patient Reported? Taking?   Lotemax SM 0.38 % GEL   Yes No   Sig: Administer 1 drop to both eyes 3 " (three) times a day   cyclobenzaprine (FLEXERIL) 10 mg tablet   No No   Sig: Take 1 tablet (10 mg total) by mouth 3 (three) times a day   diazepam (VALIUM) 5 mg tablet   No No   Sig: TAKE 1 TABLET BY MOUTH  DAILY AS NEEDED FOR ANXIETY   diphenhydrAMINE (BENADRYL) 25 mg capsule  Self Yes No   Sig: Take 25 mg by mouth every 6 (six) hours as needed for itching   fluticasone (FLONASE) 50 mcg/act nasal spray  Self Yes No   Si sprays into each nostril daily   hydrocortisone (WESTCORT) 0.2 % cream   Yes No   rizatriptan (MAXALT) 10 mg tablet   No No   Sig: TAKE 1 TABLET BY MOUTH 1 TIME AS NEEDED FOR MIGRAINE FOR UP TO 1  DOSE   zolpidem (AMBIEN) 10 mg tablet   No No   Sig: TAKE 1 TABLET BY MOUTH  DAILY AT BEDTIME AS NEEDED  FOR SLEEP      Facility-Administered Medications: None       Past Medical History:   Diagnosis Date    Anxiety     Insomnia     Migraines     Pancreas cyst        Past Surgical History:   Procedure Laterality Date    BREAST SURGERY      HERNIA REPAIR      IA EDG US EXAM SURGICAL ALTER STOM DUODENUM/JEJUNUM N/A 4/10/2019    Procedure: LINEAR ENDOSCOPIC U/S;  Surgeon: Doug Avelar MD;  Location: [facility];  Service: Gastroenterology    SHOULDER SURGERY Right     SHOULDER SURGERY         Family History   Problem Relation Age of Onset    Substance Abuse Family     Arthritis Father     Arthritis Sister     Alcohol abuse Brother      I have reviewed and agree with the history as documented.    E-Cigarette/Vaping    E-Cigarette Use Never User      E-Cigarette/Vaping Substances    Nicotine No     THC No     CBD No     Flavoring No     Other No     Unknown No      Social History     Tobacco Use    Smoking status: Former    Smokeless tobacco: Never    Tobacco comments:     quit 22 years ago    Vaping Use    Vaping status: Never Used   Substance Use Topics    Alcohol use: Yes     Comment: rare    Drug use: Never       Review of Systems   Constitutional:  Negative for chills and fever.   HENT:  Negative for  congestion, ear pain, rhinorrhea and sore throat.    Respiratory:  Negative for cough, chest tightness, shortness of breath and wheezing.    Cardiovascular:  Negative for chest pain, palpitations and leg swelling.   Gastrointestinal:  Negative for abdominal pain, constipation, diarrhea, nausea and vomiting.   Genitourinary:  Negative for difficulty urinating, dysuria, flank pain, frequency, hematuria and pelvic pain.   Musculoskeletal:  Positive for back pain. Negative for neck pain and neck stiffness.   Skin:  Negative for color change, pallor, rash and wound.   Allergic/Immunologic: Negative for immunocompromised state.   Neurological:  Negative for dizziness, syncope, weakness, light-headedness, numbness and headaches.   Hematological:  Negative for adenopathy. Does not bruise/bleed easily.   Psychiatric/Behavioral:  Negative for confusion and decreased concentration.    All other systems reviewed and are negative.      Physical Exam  Physical Exam  Vitals and nursing note reviewed.   Constitutional:       General: She is not in acute distress.     Appearance: Normal appearance. She is well-developed. She is not ill-appearing, toxic-appearing or diaphoretic.   HENT:      Head: Normocephalic and atraumatic.      Right Ear: External ear normal.      Left Ear: External ear normal.      Mouth/Throat:      Mouth: Mucous membranes are moist.      Pharynx: Oropharynx is clear.   Eyes:      Extraocular Movements: Extraocular movements intact.      Conjunctiva/sclera: Conjunctivae normal.   Neck:      Vascular: No JVD.   Cardiovascular:      Rate and Rhythm: Normal rate and regular rhythm.      Pulses: Normal pulses.      Heart sounds: Normal heart sounds. No murmur heard.     No friction rub. No gallop.   Pulmonary:      Effort: Pulmonary effort is normal. No respiratory distress.      Breath sounds: Normal breath sounds. No wheezing, rhonchi or rales.   Abdominal:      General: There is no distension.      Palpations:  Abdomen is soft.      Tenderness: There is no abdominal tenderness. There is no guarding or rebound.   Musculoskeletal:         General: No swelling or tenderness. Normal range of motion.      Cervical back: Normal range of motion and neck supple. No rigidity or tenderness.      Comments: No reproducible midline thoracolumbar spine tenderness.  No significant thoracolumbar paravertebral muscle tenderness. +Straight leg raise test on left.    Skin:     General: Skin is warm and dry.      Coloration: Skin is not pale.      Findings: No erythema or rash.   Neurological:      General: No focal deficit present.      Mental Status: She is alert and oriented to person, place, and time.      Sensory: No sensory deficit.      Motor: No weakness.      Comments: 5/5 strength in bilateral lower extremities.  Gross sensation equal and intact in bilateral lower extremities.   Psychiatric:         Mood and Affect: Mood normal.         Behavior: Behavior normal.         Vital Signs  ED Triage Vitals   Temperature Pulse Respirations Blood Pressure SpO2   06/19/24 0810 06/19/24 0810 06/19/24 0810 06/19/24 0811 06/19/24 0810   98.2 °F (36.8 °C) 75 16 116/79 100 %      Temp Source Heart Rate Source Patient Position - Orthostatic VS BP Location FiO2 (%)   06/19/24 0810 06/19/24 0810 06/19/24 0810 06/19/24 0810 --   Oral Monitor Sitting Right arm       Pain Score       --                Vitals:    06/19/24 0810 06/19/24 0811   BP:  116/79   Pulse: 75    Resp: 16    Temp: 98.2 °F (36.8 °C)    TempSrc: Oral    SpO2: 100%           Visual Acuity      ED Medications  Medications   lidocaine (LIDODERM) 5 % patch 1 patch (1 patch Topical Medication Applied 6/19/24 0834)   celecoxib (CeleBREX) capsule 200 mg (200 mg Oral Given 6/19/24 0834)   acetaminophen (TYLENOL) tablet 975 mg (975 mg Oral Given 6/19/24 0834)       Diagnostic Studies  Results Reviewed       None                   CT spine lumbar without contrast   Final Result by Noe  MD Eric (06/19 5496)      1. No acute abnormality in the lumbar spine.      2. L5-S1 disc bulge with narrowing of the left subarticular recess and likely contact of the traversing left S1 nerve root. Correlate for radiculopathy in this distribution.         Workstation performed: [id number]                    Procedures  Procedures         ED Course  ED Course as of 06/19/24 0937   Wed Jun 19, 2024   0930 Updated patient about CT findings of L5-S1 disc bulge encroaching on left S1 nerve root which could be causing her left lumbar radiculopathy.  Patient reports some improvement in pain.  Plan is to discharge with prescription for Celebrex and prednisone taper and advised her to take these with food and on an empty stomach.  She may add on Tylenol, over-the-counter Salonpas patches as needed and also recommended heating pad on and off.  Discussed ED return parameters.                                             Medical Decision Making  53-year-old female with history of sciatica, presents to the ED for acutely worsened left low back pain radiating into the left thigh.  Differential includes exacerbation of sciatica, new acute disc herniation secondary to recent fall, less likely spine fracture but still considered.  Will evaluate with CT scan of the lumbar spine without contrast.  Will treat symptomatically with Celebrex, Tylenol, lidocaine patch.  I did offer IM Toradol but patient would prefer the p.o. NSAID.  Will likely send home with prednisone taper, prescription for Celebrex and referral to comprehensive spine program.     Amount and/or Complexity of Data Reviewed  Radiology: ordered. Decision-making details documented in ED Course.    Risk  OTC drugs.  Prescription drug management.             Disposition  Final diagnoses:   Left lumbar radiculopathy   Degenerative disc disease at L5-S1 level - L5-S1 disc bulge with encroachment on left S1 nerve root     Time reflects when diagnosis was documented in  both MDM as applicable and the Disposition within this note       Time User Action Codes Description Comment    6/19/2024  9:33 AM Vashti Sheridan Add [M54.16] Left lumbar radiculopathy     6/19/2024  9:33 AM Vashti Sheridan Add [M51.37] Degenerative disc disease at L5-S1 level     6/19/2024  9:33 AM Vashti Sheridan Modify [M51.37] Degenerative disc disease at L5-S1 level L5-S1 disc bulge with encroachment on left S1 nerve root          ED Disposition       ED Disposition   Discharge    Condition   Stable    Date/Time   Wed Jun 19, 2024  9:34 AM    Comment   Shahrzadirais Espino discharge to home/self care.                   Follow-up Information       Follow up With Specialties Details Why Contact Info Additional Information    Ivone Angela PA-C Family Medicine, Internal Medicine, Physician Assistant Schedule an appointment as soon as possible for a visit   [address] [place] [id number]  [phone]       [facility] Emergency Medicine Go to  If symptoms worsen [address] [facility] [id number] [phone] [facility], [address] [place], [place], [address]            Patient's Medications   Discharge Prescriptions    CELECOXIB (CELEBREX) 200 MG CAPSULE    Take 1 tablet every 12-24 hours as needed for mild to moderate pain.  Take with food.  Avoid use with other NSAIDs.       Start Date: 6/19/2024 End Date: --       Order Dose: --       Quantity: 30 capsule    Refills: 0    PREDNISONE 10 MG TABLET    Take 5 tablets (50 mg total) by mouth daily for 2 days, THEN 4 tablets (40 mg total) daily for 2 days, THEN 3 tablets (30 mg total) daily for 2 days, THEN 2 tablets (20 mg total) daily for 2 days.       Start Date: 6/19/2024 End Date: 6/27/2024       Order Dose: --       Quantity: 28 tablet    Refills: 0           PDMP Review         Value Time User    PDMP Reviewed  Yes 3/30/2020  9:36 AM  FRANCISCO Freitas            ED Provider  Electronically Signed by             Vashti Sheridan,   06/19/24 0937

## 2024-06-20 ENCOUNTER — OFFICE VISIT (OUTPATIENT)
Dept: INTERNAL MEDICINE CLINIC | Facility: CLINIC | Age: 54
End: 2024-06-20
Payer: COMMERCIAL

## 2024-06-20 ENCOUNTER — TELEPHONE (OUTPATIENT)
Dept: PHYSICAL THERAPY | Facility: OTHER | Age: 54
End: 2024-06-20

## 2024-06-20 ENCOUNTER — TELEPHONE (OUTPATIENT)
Age: 54
End: 2024-06-20

## 2024-06-20 VITALS
SYSTOLIC BLOOD PRESSURE: 124 MMHG | TEMPERATURE: 97.8 F | HEART RATE: 57 BPM | DIASTOLIC BLOOD PRESSURE: 82 MMHG | BODY MASS INDEX: 21.17 KG/M2 | OXYGEN SATURATION: 99 % | WEIGHT: 124 LBS | HEIGHT: 64 IN

## 2024-06-20 DIAGNOSIS — M54.32 SCIATICA OF LEFT SIDE: Primary | ICD-10-CM

## 2024-06-20 PROCEDURE — 99214 OFFICE O/P EST MOD 30 MIN: CPT | Performed by: PHYSICIAN ASSISTANT

## 2024-06-20 PROCEDURE — 96372 THER/PROPH/DIAG INJ SC/IM: CPT | Performed by: PHYSICIAN ASSISTANT

## 2024-06-20 RX ORDER — MOMETASONE FUROATE 1 MG/ML
SOLUTION TOPICAL
COMMUNITY
Start: 2024-04-22

## 2024-06-20 RX ORDER — KETOROLAC TROMETHAMINE 30 MG/ML
30 INJECTION, SOLUTION INTRAMUSCULAR; INTRAVENOUS ONCE
Status: COMPLETED | OUTPATIENT
Start: 2024-06-20 | End: 2024-06-20

## 2024-06-20 RX ORDER — TRIAMCINOLONE ACETONIDE 1 MG/G
CREAM TOPICAL AS NEEDED
COMMUNITY
Start: 2024-04-05

## 2024-06-20 RX ORDER — ACETAMINOPHEN AND CODEINE PHOSPHATE 300; 30 MG/1; MG/1
1 TABLET ORAL EVERY 6 HOURS PRN
Qty: 30 TABLET | Refills: 0 | Status: SHIPPED | OUTPATIENT
Start: 2024-06-20 | End: 2024-06-28

## 2024-06-20 RX ORDER — KETOCONAZOLE 20 MG/ML
SHAMPOO TOPICAL AS NEEDED
COMMUNITY
Start: 2024-04-22

## 2024-06-20 RX ADMIN — KETOROLAC TROMETHAMINE 30 MG: 30 INJECTION, SOLUTION INTRAMUSCULAR; INTRAVENOUS at 11:48

## 2024-06-20 NOTE — TELEPHONE ENCOUNTER
Call placed to the patient per [facility] referral.    Spoke with the patient who is following up with her PCP today, 6/20/24. And also has a PT eval on 7/1/24 for her back pain.     Comp spine closed

## 2024-06-20 NOTE — ASSESSMENT & PLAN NOTE
Discontinue celebrex. Continue steroid. 30mg toradol given IM in office. Will follow with tylenol #3 for short course. Call if failure to improve.

## 2024-06-20 NOTE — PROGRESS NOTES
Ambulatory Visit  Name: Shahrzad Espino      : 1970      MRN: [id number]  Encounter Provider: Ivone Angela PA-C  Encounter Date: 2024   Encounter department: [facility]    Assessment & Plan   1. Sciatica of left side  Assessment & Plan:  Discontinue celebrex. Continue steroid. 30mg toradol given IM in office. Will follow with tylenol #3 for short course. Call if failure to improve.   Orders:  -     ketorolac (TORADOL) injection 30 mg  -     acetaminophen-codeine (TYLENOL/CODEINE #3) 300-30 MG per tablet; Take 1 tablet by mouth every 6 (six) hours as needed for moderate pain         History of Present Illness     Pt presents for ER follow up for acute left sided sciatica. Symptoms began a few days ago. She has a hx of this but this episode was the most severe it has ever been and was refractory to the strategies that typically work for her. She tried flexeril without benefit. CT was done which showed L5 disc bulge pressing on her nerve root. She was given steroid and celebrex and symptoms persist. She is visibly uncomfortable. No loss of bowel or bladder function. Pain worsens with flexion/extension.       Review of Systems   Constitutional:  Negative for chills and fever.   HENT:  Negative for congestion, ear pain, hearing loss, postnasal drip, rhinorrhea, sinus pressure, sinus pain, sore throat and trouble swallowing.    Eyes:  Negative for pain and visual disturbance.   Respiratory:  Negative for cough, chest tightness, shortness of breath and wheezing.    Cardiovascular: Negative.  Negative for chest pain, palpitations and leg swelling.   Gastrointestinal:  Negative for abdominal pain, blood in stool, constipation, diarrhea, nausea and vomiting.   Endocrine: Negative for cold intolerance, heat intolerance, polydipsia, polyphagia and polyuria.   Genitourinary:  Negative for difficulty urinating, dysuria, flank pain and urgency.   Musculoskeletal:  Positive for back pain.  Negative for arthralgias, gait problem and myalgias.   Skin:  Negative for rash.   Allergic/Immunologic: Negative.    Neurological:  Negative for dizziness, weakness, light-headedness and headaches.   Hematological: Negative.    Psychiatric/Behavioral:  Negative for behavioral problems, dysphoric mood and sleep disturbance. The patient is not nervous/anxious.      Past Medical History:   Diagnosis Date    Anxiety     Insomnia     Migraines     Pancreas cyst      Past Surgical History:   Procedure Laterality Date    BREAST SURGERY      HERNIA REPAIR      [facility] EDG US EXAM SURGICAL ALTER STOM DUODENUM/JEJUNUM N/A 4/10/2019    Procedure: LINEAR ENDOSCOPIC U/S;  Surgeon: Doug Avelar MD;  Location: [facility];  Service: Gastroenterology    SHOULDER SURGERY Right     SHOULDER SURGERY       Family History   Problem Relation Age of Onset    Substance Abuse Family     Arthritis Father     Arthritis Sister     Alcohol abuse Brother      Social History     Tobacco Use    Smoking status: Former     Passive exposure: Past    Smokeless tobacco: Never    Tobacco comments:     quit 22 years ago    Vaping Use    Vaping status: Never Used   Substance and Sexual Activity    Alcohol use: Yes     Comment: rare    Drug use: Never    Sexual activity: Not Currently     Current Outpatient Medications on File Prior to Visit   Medication Sig    celecoxib (CeleBREX) 200 mg capsule Take 1 tablet every 12-24 hours as needed for mild to moderate pain.  Take with food.  Avoid use with other NSAIDs.    cyclobenzaprine (FLEXERIL) 10 mg tablet Take 1 tablet (10 mg total) by mouth 3 (three) times a day    diazepam (VALIUM) 5 mg tablet TAKE 1 TABLET BY MOUTH  DAILY AS NEEDED FOR ANXIETY    diphenhydrAMINE (BENADRYL) 25 mg capsule Take 25 mg by mouth every 6 (six) hours as needed for itching    fluticasone (FLONASE) 50 mcg/act nasal spray 2 sprays into each nostril daily    hydrocortisone (WESTCORT) 0.2 % cream     ketoconazole (NIZORAL) 2 % shampoo as  "needed    Lotemax SM 0.38 % GEL Administer 1 drop to both eyes 3 (three) times a day    mometasone (ELOCON) 0.1 % lotion     predniSONE 10 mg tablet Take 5 tablets (50 mg total) by mouth daily for 2 days, THEN 4 tablets (40 mg total) daily for 2 days, THEN 3 tablets (30 mg total) daily for 2 days, THEN 2 tablets (20 mg total) daily for 2 days.    rizatriptan (MAXALT) 10 mg tablet TAKE 1 TABLET BY MOUTH 1 TIME AS NEEDED FOR MIGRAINE FOR UP TO 1  DOSE    triamcinolone (KENALOG) 0.1 % cream as needed    zolpidem (AMBIEN) 10 mg tablet TAKE 1 TABLET BY MOUTH  DAILY AT BEDTIME AS NEEDED  FOR SLEEP     Allergies   Allergen Reactions    Hydrocodone Headache     Immunization History   Administered Date(s) Administered    Tdap 06/01/2010     Objective     /82 (BP Location: Left arm, Patient Position: Sitting, Cuff Size: Adult)   Pulse 57   Temp 97.8 °F (36.6 °C) (Tympanic)   Ht 5' 4\" (1.626 m)   Wt 56.2 kg (124 lb)   LMP 02/24/2012   SpO2 99%   BMI 21.28 kg/m²     Physical Exam  Vitals and nursing note reviewed.   Constitutional:       Appearance: She is well-developed.   HENT:      Head: Normocephalic and atraumatic.      Nose: Nose normal.   Eyes:      Conjunctiva/sclera: Conjunctivae normal.   Pulmonary:      Effort: Pulmonary effort is normal.   Musculoskeletal:      Cervical back: Normal range of motion.      Lumbar back: Spasms present. Decreased range of motion.   Neurological:      Mental Status: She is alert and oriented to person, place, and time.   Psychiatric:         Behavior: Behavior normal.         Thought Content: Thought content normal.         Judgment: Judgment normal.       Administrative Statements   I have spent a total time of 20 minutes on 06/20/24 In caring for this patient including Instructions for management, Importance of tx compliance, Risk factor reductions, Counseling / Coordination of care, and Communicating with other healthcare professionals .  "

## 2024-06-20 NOTE — TELEPHONE ENCOUNTER
Pt complains of extreme pain in sciatic and lumbar region. Pt states she was seen in ER and states they didn't do anything for her beside give her extra strength tylenol. She mad an appt to see PCP, today 6/20@9647

## 2024-06-28 ENCOUNTER — TELEPHONE (OUTPATIENT)
Dept: INTERNAL MEDICINE CLINIC | Facility: CLINIC | Age: 54
End: 2024-06-28

## 2024-06-28 DIAGNOSIS — M54.32 SCIATICA OF LEFT SIDE: Primary | ICD-10-CM

## 2024-06-28 RX ORDER — NAPROXEN 500 MG/1
500 TABLET ORAL 2 TIMES DAILY WITH MEALS
Qty: 60 TABLET | Refills: 1 | Status: SHIPPED | OUTPATIENT
Start: 2024-06-28

## 2024-06-28 NOTE — TELEPHONE ENCOUNTER
Please reach out to pt and clarify if naprosyn is what she wanted. Its not on her active or previous med list

## 2024-06-28 NOTE — TELEPHONE ENCOUNTER
Patient called into Rx refill line looking to get a refill on a medication that was prescribed to her by an urgent care. It is not on her active medication list. It is called Naproxen (Naprobyn) 500mg 1 tablet 2 times daily. She states it helps with her leg burning. Please contact patient at [phone].

## 2024-07-01 ENCOUNTER — OFFICE VISIT (OUTPATIENT)
Dept: PHYSICAL THERAPY | Facility: CLINIC | Age: 54
End: 2024-07-01
Payer: COMMERCIAL

## 2024-07-01 DIAGNOSIS — M54.16 LEFT LUMBAR RADICULOPATHY: Primary | ICD-10-CM

## 2024-07-01 PROCEDURE — 97161 PT EVAL LOW COMPLEX 20 MIN: CPT

## 2024-07-01 NOTE — PROGRESS NOTES
PT Evaluation     Today's date: 2024  Patient name: Shahrzad Espino  : 1970  MRN: [id number]  Referring provider: Birgit Bowling, FER  Dx:   Encounter Diagnosis     ICD-10-CM    1. Left lumbar radiculopathy  M54.16                      Assessment  Impairments: abnormal gait, abnormal or restricted ROM, activity intolerance, impaired physical strength, lacks appropriate home exercise program, pain with function, poor posture  and poor body mechanics  Symptom irritability: moderate    Assessment details: Shahrzad Espino is a 53 y.o. female who presents with pain, decreased strength, decreased ROM, ambulatory dysfunction, postural dysfunction, and balance dysfunction. Due to these impairments, patient has difficulty performing ADL's, recreational activities, work-related activities, ambulation, stair negotiation, lifting/carrying, transfers. Patient's clinical presentation is consistent with their referring diagnosis of Left lumbar radiculopathy  (primary encounter diagnosis). Mechanical assessment revealed signs of lumbar derangement with lumbar extension directional preference. Patient given prone press up with HEP to further assess. Patient demo understanding of edu on centralization and peripheralization and when to stop vs continue based on results.Patient has been educated in home exercise program and plan of care. Patient would benefit from skilled physical therapy services to address their aforementioned functional limitations and progress towards prior level of function and independence with home exercise program.       Goals  Short term goals to be accomplished in 4 weeks:  STG 1: Pt will demo independence with postural management  STG 2: Pt will demo I with HEP to maximize progress between therapy sessions  STG 3: Pt will demo L/S AROM < or = mod loss throughout to promote improved functional mobility and body mechanics  STG 4: Pt will demo 1/2 MMT grade in B/L Hips to improve stability  with functional challenges  STG 5: Pt will reports pain dec freq and intensity 50%      Long term goals to be accomplished in 12 weeks:  LTG 1: Pt will demo good body mech with >75% functional challenges to prevent reinjury  LTG 2: Pt will be able to return to standing for 30 minutes pain free as per PLOF  LTG 3: Pt will demo Good strength core stabilizers to promote carryover with body mech and posture  LTG4: Pt will deny sleep disturbance secondary to pain   LTG5: Pt will demo L/S AROM < or = min loss throughout to promote improved functional mobility and body mechanics      Plan  Patient would benefit from: PT eval and skilled physical therapy  Planned modality interventions: cryotherapy and thermotherapy: hydrocollator packs    Planned therapy interventions: manual therapy, neuromuscular re-education, self care, therapeutic activities, therapeutic exercise, home exercise program, abdominal trunk stabilization, IASTM, joint mobilization, balance/weight bearing training, postural training, patient education, flexibility, strengthening and stretching    Frequency: 2x week  Plan of Care beginning date: 7/1/2024  Plan of Care expiration date: 9/23/2024  Treatment plan discussed with: patient  Plan details: HEP development, stretching, strengthening, A/AA/PROM, joint mobilizations, posture education, STM/MI as needed to reduce muscle tension, muscle reeducation, PLOC discussed and agreed upon with patient.          Subjective Evaluation    History of Present Illness  Mechanism of injury: Patient reports to PT through comp spine program for acute onset of chronic L sciatic pain. Patient stated pain began on 6/19 and she went to the hospital. Since then patient has seen chiropractor and began inversion table at home which has lessened the pain. Patient states at times the pain spreads into calf. Patient has constant pain in hip and buttock, thigh and calf pain comes and goes. Patient is on naproxen for nerve pain.    Patient Goals  Patient goals for therapy: decreased pain, increased strength, independence with ADLs/IADLs, increased motion, return to sport/leisure activities and return to work    Pain  Current pain ratin (with naproxen. pain inL buttock)  At best pain ratin (with naproxen)  At worst pain ratin (after sitting in car for 9 hours)  Quality: burning and dull ache  Relieving factors: medications, ice and heat (laying on massage table with anterior pelvic tilt)  Aggravating factors: sitting and standing (prolonged positions)  Progression: improved    Social Support  Stairs in house: yes   Lives in: multiple-level home  Lives with: spouse    Employment status: working (self employed)    Diagnostic Tests  Abnormal CT scan: L5-S1 disc bulge.  Treatments  Current treatment: chiropractic      Objective    Posture: Lumbar lordosis is decreased in standing. There is no lateral shift.    In siting, lumbar roll does not change comfort.    Lumbar AROM limitations:  (*=  Pain)  Lumbar flexion: Mod- severe carmichael  Lumbar extension: Mod carmichael   R side glide:  Min carmichael  L side glide:  Min carmichael    Mechanical Asessment: pre-test symtpoms include 2/10 in low back into buttock and mildly into post thigh   Repeated Flexion in sitting (RFISit): NT  Repeated Extension in Standing (MIRNA):1x10 inc/w in thigh   Repeated Extension in Lying (REIL): 1x10 abolish in thigh        Strength:  Core strength: Fair      Right  Left  Knee ext  4+/5  4+/5  Knee flex  4+/5  4+/5  Ankle DF  5/5  5/5  Ankle PF  5/5  5/5      Hip flexion(sit): 4+/5  4/5  Hip abduction(sit) 4+/5  4+/5  Hip adduction(sit) 4+/5  4+/5      Tenderness/Palpation: mild TTP at L buttock     Sensation: TBA in future      Flexibility:  Mod carmichael in flexibility into ER and in quads and hip flexors     Function: inc pain with standing of 5 min            Precautions:   Past Medical History:   Diagnosis Date   • Anxiety    • Insomnia    • Migraines    • Pancreas cyst      SOC:  7/1/2024  FOTO: 7/1/2024  POC Expiration:9/23/2024  Daily Treatment Log:  Date 7/1/2024       Visit # 1       Auth         Auth exp        Manual                        There Exer        Clamshells         Std hip abd         Std hip ext         Figure 4 stretch         Supine hip flexor stretch                         HEP Created and discussed        There Activ                                                        NMReed        Bridges         Prone press up         Sciatic nerve glide         Side stepping         Monster walks                 Modalities                                HEP:   Access Code: [email or web address]  URL: [email or web address]  Date: 07/01/2024  Prepared by: Birgit Bowling    Exercises  - Prone Press Up  - 4-5 x daily - 7 x weekly - 1 sets - 10 reps

## 2024-07-03 ENCOUNTER — OFFICE VISIT (OUTPATIENT)
Dept: PHYSICAL THERAPY | Facility: CLINIC | Age: 54
End: 2024-07-03
Payer: COMMERCIAL

## 2024-07-03 DIAGNOSIS — M54.16 LEFT LUMBAR RADICULOPATHY: Primary | ICD-10-CM

## 2024-07-03 PROCEDURE — 97110 THERAPEUTIC EXERCISES: CPT

## 2024-07-03 PROCEDURE — 97112 NEUROMUSCULAR REEDUCATION: CPT

## 2024-07-03 NOTE — PROGRESS NOTES
"Daily Note     Today's date: 7/3/2024  Patient name: Shahrzad Espino  : 1970  MRN: [id number]  Referring provider: Birgit Bowling, PT  Dx:   Encounter Diagnosis     ICD-10-CM    1. Left lumbar radiculopathy  M54.16                      Subjective: Patient reports symptoms are about the same with occasional symptoms into the thigh. Patient reports she was able to do press ups about 3 times a day except for yesterday when she had limited ability being in the car for many hours.       Objective: See treatment diary below      Assessment: Tolerated treatment well with edu on need to follow prescribed frequency with prone press up to full assess response. Patient verbalized understanding. Noted increased symptoms with bridges and std hip abd. Potential trial of mechanical traction next session based on patient response to REIL. Patient would benefit from continued PT      Plan: Continue per plan of care.      Precautions:   Past Medical History:   Diagnosis Date    Anxiety     Insomnia     Migraines       Pancreas cyst      SOC: 2024  FOTO: 2024  POC Expiration:2024  Daily Treatment Log:  Date 2024 7/3/2024      Visit # 1 2      Auth  TBD TBD      Auth exp        Manual                        There Exer  20'      Clamshells   RTB 1x10 R/L      Std hip abd   RTB 1x5 R/L alt (inc/w in L post thigh)      Std hip ext         Figure 4 stretch   15\" x4 R/L       Supine hip flexor stretch   20\" x3 R/L                       HEP Created and discussed  Updated and discussed       There Activ                                                        NMReed  15'      Bridges   1x3 (inc in L post thigh)       Prone press up   1x10; 3x       Sciatic nerve glide   Supine 2x10 R/L w/ sos       Side stepping         Monster walks                 Modalities        Mechanical traction                         HEP:   Access Code: [email or web address]  URL: [email or web address]  Date: 2024  Prepared by: " Birgit Bowling    Exercises  - Prone Press Up  - 4-5 x daily - 7 x weekly - 1 sets - 10 reps  - Supine 90/90 Sciatic Nerve Glide with Knee Flexion/Extension  - 1-2 x daily - 7 x weekly - 1 sets - 10 reps  - Supine Figure 4 Piriformis Stretch  - 1 x daily - 7 x weekly - 4 reps - 15 sec  hold  - Modified Cristhian Stretch  - 1 x daily - 7 x weekly - 3 reps - 20 sec  hold

## 2024-07-08 ENCOUNTER — OFFICE VISIT (OUTPATIENT)
Dept: PHYSICAL THERAPY | Facility: CLINIC | Age: 54
End: 2024-07-08
Payer: COMMERCIAL

## 2024-07-08 DIAGNOSIS — M54.16 LEFT LUMBAR RADICULOPATHY: Primary | ICD-10-CM

## 2024-07-08 PROCEDURE — 97112 NEUROMUSCULAR REEDUCATION: CPT

## 2024-07-08 PROCEDURE — 97110 THERAPEUTIC EXERCISES: CPT

## 2024-07-08 PROCEDURE — 97012 MECHANICAL TRACTION THERAPY: CPT

## 2024-07-08 NOTE — PROGRESS NOTES
"Daily Note     Today's date: 2024  Patient name: Shahrzad Espino  : 1970  MRN: [id number]  Referring provider: Birgit Bowling, PT  Dx:   Encounter Diagnosis     ICD-10-CM    1. Left lumbar radiculopathy  M54.16                      Subjective: Patient reports symptoms are better in the thigh but seem to not centralize past the L buttock (currently 7/10). Patient has not been taking naproxen to better gauge symptoms and feels the burning in the post thigh is less intense when it does occur.       Objective: See treatment diary below      Assessment: Tolerated treatment well with addition of mechanical traction today - noted abolishment of glute pain during, glute pain did return with standing, however reported it was a and lower intensity (2/10). Will report back next session on post session response. Patient denied any increases in pain during stretches only noted tightness and very mild burning that did not linger. Discussed pain at end range that does not linger is normal vs pain that radiates or lingers is not. Patient to report back on pain location with press up next session. Patient would benefit from continued PT      Plan: Continue per plan of care.      Precautions:   Past Medical History:   Diagnosis Date    Anxiety     Insomnia     Migraines       Pancreas cyst      SOC: 2024  FOTO: 2024  POC Expiration:2024  Daily Treatment Log:  Date 2024 7/3/2024 2024     Visit # 1 2 3     Auth  TBD TBD 3/     Auth exp        Manual                        There Exer  20' 20'      Clamshells   RTB 1x10 R/L RTB 1x10 R/L     Std hip abd   RTB 1x5 R/L alt (inc/w in L post thigh)      Std hip ext         Figure 4 stretch   15\" x4 R/L  15\" x4 R/L      Supine hip flexor stretch   20\" x3 R/L  20\" x3 R/L                      HEP Created and discussed  Updated and discussed       There Activ                                                        NMReed  15' 10'     Bridges   1x3 (inc in L " post thigh)  HOLD      Prone press up   1x10; 3x  1x10      Sciatic nerve glide   Supine 2x10 R/L w/ sos  Supine 2x10 R/L w/ sos      Side stepping         Monster walks                 Modalities        Mechanical traction    Max: 66  Min: 20    17 min total                      HEP:   Access Code: [email or web address]  URL: [email or web address]  Date: 07/03/2024  Prepared by: Birgit Bowling    Exercises  - Prone Press Up  - 4-5 x daily - 7 x weekly - 1 sets - 10 reps  - Supine 90/90 Sciatic Nerve Glide with Knee Flexion/Extension  - 1-2 x daily - 7 x weekly - 1 sets - 10 reps  - Supine Figure 4 Piriformis Stretch  - 1 x daily - 7 x weekly - 4 reps - 15 sec  hold  - Modified Cristhian Stretch  - 1 x daily - 7 x weekly - 3 reps - 20 sec  hold

## 2024-07-10 ENCOUNTER — OFFICE VISIT (OUTPATIENT)
Dept: PHYSICAL THERAPY | Facility: CLINIC | Age: 54
End: 2024-07-10
Payer: COMMERCIAL

## 2024-07-10 DIAGNOSIS — M54.16 LEFT LUMBAR RADICULOPATHY: Primary | ICD-10-CM

## 2024-07-10 PROCEDURE — 97112 NEUROMUSCULAR REEDUCATION: CPT

## 2024-07-10 PROCEDURE — 97012 MECHANICAL TRACTION THERAPY: CPT

## 2024-07-10 NOTE — PROGRESS NOTES
"Daily Note     Today's date: 7/10/2024  Patient name: Sahhrzad Espino  : 1970  MRN: [id number]  Referring provider: Birgit Bowling, PT  Dx:   Encounter Diagnosis     ICD-10-CM    1. Left lumbar radiculopathy  M54.16                      Subjective: pt reports that things have been the same over the last couple days since she left here 2-3/10. She did cont her REIL, she does get the pain in her LB and her L buttock when she is pushing up but it goes away when she returns flat. On arrival to session pain L buttock 2/10, less than 1/10 down to the back of the knee.       Objective: See treatment diary below  REIL 1x10=NE  REIL from qped 2x5=anthony/b post knee pain; NE to L buttock   Post mech lumbar tx=dec/ L buttock       Assessment: Tolerated treatment well. Pt does dem abolished LE symptoms post REIL from qped accomplishing increased lower lumbar ext, pt to cont with these for further assessment of her response to this modification to her REIL. Pt cont with relief post session in L buttock pain post mech tx. Patient would benefit from continued PT      Plan: Continue per plan of care.      Precautions:   Past Medical History:   Diagnosis Date    Anxiety     Insomnia     Migraines       Pancreas cyst      SOC: 2024  FOTO: 2024  POC Expiration:2024  Daily Treatment Log:  Date 2024 7/3/2024 2024 7/10/2024    Visit # 1 2 3 4     Auth  TBD TBD 3/12 4/12    Auth exp        Manual                        There Exer  20' 20'      Clamshells   RTB 1x10 R/L RTB 1x10 R/L     Std hip abd   RTB 1x5 R/L alt (inc/w in L post thigh)      Std hip ext         Figure 4 stretch   15\" x4 R/L  15\" x4 R/L  15\"x4 R/L     Supine hip flexor stretch   20\" x3 R/L  20\" x3 R/L                      HEP Created and discussed  Updated and discussed   Pt edu on disc mech centralized symptoms     There Activ                                                        NMReed  15' 10' 25'    Bridges   1x3 (inc in L post " thigh)  HOLD      Prone press up   1x10; 3x  1x10      Sciatic nerve glide   Supine 2x10 R/L w/ sos  Supine 2x10 R/L w/ sos  Supine 1x15 R/L w/ sos     Side stepping         Monster walks         [facility] assessment     15'     Modalities    20' w/ Set up     Mechanical lumbar traction    Max: 66  Min: 20    17 min total  Max:66#  Min 20#                     HEP:   Access Code: [email or web address]  URL: [email or web address]  Date: 07/03/2024  Prepared by: Birgit Bowling    Exercises  - Prone Press Up  - 4-5 x daily - 7 x weekly - 1 sets - 10 reps  - Supine 90/90 Sciatic Nerve Glide with Knee Flexion/Extension  - 1-2 x daily - 7 x weekly - 1 sets - 10 reps  - Supine Figure 4 Piriformis Stretch  - 1 x daily - 7 x weekly - 4 reps - 15 sec  hold  - Modified Cristhian Stretch  - 1 x daily - 7 x weekly - 3 reps - 20 sec  hold

## 2024-07-15 ENCOUNTER — OFFICE VISIT (OUTPATIENT)
Dept: PHYSICAL THERAPY | Facility: CLINIC | Age: 54
End: 2024-07-15
Payer: COMMERCIAL

## 2024-07-15 DIAGNOSIS — M54.16 LEFT LUMBAR RADICULOPATHY: Primary | ICD-10-CM

## 2024-07-15 PROCEDURE — 97012 MECHANICAL TRACTION THERAPY: CPT

## 2024-07-15 PROCEDURE — 97112 NEUROMUSCULAR REEDUCATION: CPT

## 2024-07-15 NOTE — PROGRESS NOTES
"Daily Note     Today's date: 7/15/2024  Patient name: Shahrzad Espino  : 1970  MRN: [id number]  Referring provider: Birgit Bowling, PT  Dx:   Encounter Diagnosis     ICD-10-CM    1. Left lumbar radiculopathy  M54.16                      Subjective: pt reports that she has about 1/10 pain in low back and slightly into buttock    Objective: See treatment diary below        Assessment: Tolerated treatment well. Pt cont with relief post session in L buttock pain post [facility] tx and with Qped hip drops. Patient instructed to continue with these at home. Patient would benefit from continued PT      Plan: Continue per plan of care.      Precautions:   Past Medical History:   Diagnosis Date    Anxiety     Insomnia     Migraines       Pancreas cyst      SOC: 2024  FOTO: 2024  POC Expiration:2024  Daily Treatment Log:  Date 2024 7/3/2024 2024 7/10/2024 7/15/2024   Visit # 1 2 3 4  5 (FOTO)    Auth  TBD TBD 3/12 4/12 5/12   Auth exp        Manual                        There Exer  20' 20'      Clamshells   RTB 1x10 R/L RTB 1x10 R/L     Std hip abd   RTB 1x5 R/L alt (inc/w in L post thigh)      Std hip ext         Figure 4 stretch   15\" x4 R/L  15\" x4 R/L  15\"x4 R/L  15\"x4 R/L    Supine hip flexor stretch   20\" x3 R/L  20\" x3 R/L   20\" x3 R/L                    HEP Created and discussed  Updated and discussed   Pt edu on disc [facility] centralized symptoms     There Activ                                                        NMReed  15' 10' 25' 25'   Bridges   1x3 (inc in L post thigh)  HOLD      Prone press up   1x10; 3x  1x10   Qped hip drop 1x10    Sciatic nerve glide   Supine 2x10 R/L w/ sos  Supine 2x10 R/L w/ sos  Supine 1x15 R/L w/ sos  Supine 2x10 R/L w/ sos    Side stepping         Monster walks         [facility] assessment     15'     Modalities    20' w/ Set up  20'    Mechanical lumbar traction    Max: 66  Min: 20    17 min total  Max:66#  Min 20#  Static   Max:66#  Min 20#                    "   HEP:   Access Code: [email or web address]  URL: [email or web address]  Date: 07/03/2024  Prepared by: Birgit Bowling    Exercises  - Prone Press Up  - 4-5 x daily - 7 x weekly - 1 sets - 10 reps  - Supine 90/90 Sciatic Nerve Glide with Knee Flexion/Extension  - 1-2 x daily - 7 x weekly - 1 sets - 10 reps  - Supine Figure 4 Piriformis Stretch  - 1 x daily - 7 x weekly - 4 reps - 15 sec  hold  - Modified Cristhian Stretch  - 1 x daily - 7 x weekly - 3 reps - 20 sec  hold

## 2024-07-17 ENCOUNTER — TELEPHONE (OUTPATIENT)
Dept: PHYSICAL THERAPY | Facility: CLINIC | Age: 54
End: 2024-07-17

## 2024-07-17 ENCOUNTER — APPOINTMENT (OUTPATIENT)
Dept: PHYSICAL THERAPY | Facility: CLINIC | Age: 54
End: 2024-07-17
Payer: COMMERCIAL

## 2024-07-22 ENCOUNTER — OFFICE VISIT (OUTPATIENT)
Dept: PHYSICAL THERAPY | Facility: CLINIC | Age: 54
End: 2024-07-22
Payer: COMMERCIAL

## 2024-07-22 DIAGNOSIS — M54.16 LEFT LUMBAR RADICULOPATHY: Primary | ICD-10-CM

## 2024-07-22 PROCEDURE — 97112 NEUROMUSCULAR REEDUCATION: CPT

## 2024-07-22 PROCEDURE — 97110 THERAPEUTIC EXERCISES: CPT

## 2024-07-22 PROCEDURE — 97012 MECHANICAL TRACTION THERAPY: CPT

## 2024-07-22 NOTE — PROGRESS NOTES
"Daily Note     Today's date: 2024  Patient name: Shahrzad Espino  : 1970  MRN: [id number]  Referring provider: Birgit Bowling, PT  Dx:   Encounter Diagnosis     ICD-10-CM    1. Left lumbar radiculopathy  M54.16                      Subjective: pt reports to session with no noted pain at rest less than 1/10, \" I do feel if I make one wrong move it will go out again\" overall feels things are improving.       Objective: See treatment diary below      Assessment: Tolerated treatment well. Pt able to tolerate bridges today which once produced increased symptoms, slowly increasing activity tolerance. Pt to cont with REIL from qped. Patient would benefit from continued PT      Plan: Continue per plan of care.      Precautions:   Past Medical History:   Diagnosis Date    Anxiety     Insomnia     Migraines       Pancreas cyst      SOC: 2024  FOTO: 2024  POC Expiration:2024  Daily Treatment Log:  Date 2024  2024 7/10/2024 7/15/2024   Visit # 6  3 4  5 (FOTO)    Auth  6/12  3/12 4/12 5/12   Auth exp        Manual                        There Exer 15'  20'      Clamshells  RTB 2x10 R/L  RTB 1x10 R/L     Std hip abd  RTB @ knees 1x10 R/L        Std hip ext         Figure 4 stretch  20\"x3 R/L   15\" x4 R/L  15\"x4 R/L  15\"x4 R/L    Supine hip flexor stretch  20\" x3 R/L    20\" x3 R/L   20\" x3 R/L                    HEP Updated/issued   Pt edu on disc [facility] centralized symptoms     There Activ                                                        NMReed 15'  10' 25' 25'   Bridges  1x10   HOLD      Prone press up  Qped hip drop 1x10    1x10   Qped hip drop 1x10    Sciatic nerve glide  Supine 2x10 R/L w/ sos    Supine 2x10 R/L w/ sos  Supine 1x15 R/L w/ sos  Supine 2x10 R/L w/ sos    Side stepping         Monster walks  RTB @ knees 2 laps near rail        [facility] assessment     15'     Modalities 20'    20' w/ Set up  20'    Mechanical lumbar traction  Static   Max:66#  Min 20#    Max: 66  Min: " 20    17 min total  Max:66#  Min 20#  Static   Max:66#  Min 20#                      HEP:   Access Code: [email or web address]  URL: [email or web address]  Date: 07/03/2024  Prepared by: Birgit Bowling    Exercises  - Prone Press Up  - 4-5 x daily - 7 x weekly - 1 sets - 10 reps  - Supine 90/90 Sciatic Nerve Glide with Knee Flexion/Extension  - 1-2 x daily - 7 x weekly - 1 sets - 10 reps  - Supine Figure 4 Piriformis Stretch  - 1 x daily - 7 x weekly - 4 reps - 15 sec  hold  - Modified Cristhian Stretch  - 1 x daily - 7 x weekly - 3 reps - 20 sec  hold

## 2024-07-29 ENCOUNTER — APPOINTMENT (OUTPATIENT)
Dept: PHYSICAL THERAPY | Facility: CLINIC | Age: 54
End: 2024-07-29
Payer: COMMERCIAL

## 2024-08-07 ENCOUNTER — OFFICE VISIT (OUTPATIENT)
Dept: PHYSICAL THERAPY | Facility: CLINIC | Age: 54
End: 2024-08-07
Payer: COMMERCIAL

## 2024-08-07 DIAGNOSIS — M54.16 LEFT LUMBAR RADICULOPATHY: Primary | ICD-10-CM

## 2024-08-07 PROCEDURE — 97112 NEUROMUSCULAR REEDUCATION: CPT

## 2024-08-07 PROCEDURE — 97110 THERAPEUTIC EXERCISES: CPT

## 2024-08-07 NOTE — PROGRESS NOTES
"Daily Note     Today's date: 2024  Patient name: Shahrzad Espino  : 1970  MRN: [id number]  Referring provider: Birgit Bowling, PT  Dx:   Encounter Diagnosis     ICD-10-CM    1. Left lumbar radiculopathy  M54.16                      Subjective: pt reports things are going well, pretty much the same. Presents to session with pain less than 1/10. Reports she started a walk jog program 2 days ago, she noted a \"strange feeling\" in her L buttock at the end of her progression.       Objective: See treatment diary below      Assessment: Tolerated treatment well. Pt tolerated additional core strengthening today w/o complaints or increases in pain, cont to progress functional mobility and core stability as tolerated. Pt cont to dem improvement of symptoms with ext based program at this time. Pt edu to trial REIL from QP prior to her walk/jog program to assess if her L buttock discomfort is better after. Patient would benefit from continued PT      Plan: Continue per plan of care.      Precautions:   Past Medical History:   Diagnosis Date    Anxiety     Insomnia     Migraines       Pancreas cyst      SOC: 2024  FOTO: 2024  POC Expiration:2024  Daily Treatment Log:  Date 2024 2024  7/10/2024 7/15/2024   Visit # 6 7   4  5 (FOTO)    Auth     Auth exp  9/15/2024      Manual                        There Exer 15' 10'       Clamshells  RTB 2x10 R/L GTB 2x10 R/L       Std hip abd  RTB @ knees 1x10 R/L  Steamboats RTB @ knees 1x10 R/L       Std hip ext         Figure 4 stretch  20\"x3 R/L    15\"x4 R/L  15\"x4 R/L    Supine hip flexor stretch  20\" x3 R/L   20\"x3 R/L    20\" x3 R/L    Supine leg press   B/L 40# 2x10               HEP Updated/issued  Update HEP if core stability tolerated well  Pt edu on disc mech centralized symptoms     There Activ                                                        NMReed 15' 30'   25' 25'   Bridges  1x10  2x10       Prone press up  Qped hip " drop 1x10      Qped hip drop 1x10    Sciatic nerve glide  Supine 2x10 R/L w/ sos   Supine w/ sos 1x15 R/L   Supine 1x15 R/L w/ sos  Supine 2x10 R/L w/ sos    B/L shoulder ext w/ TrA   Red tubing 2x10       Paloff press   Dbl red tubing 2x10 R/L               Side stepping   RTB @ knees 15'x2       Monster walks  RTB @ knees 2 laps near rail  RTB @ knees 2 laps near rail      [facility] assessment     15'     Modalities 20'  15'   20' w/ Set up  20'    Mechanical lumbar traction  Static   Max:66#  Min 20#   Static   Max:66#  Min 25#    Max:66#  Min 20#  Static   Max:66#  Min 20#                      HEP:   Access Code: [email or web address]  URL: [email or web address]  Date: 07/22/2024  Prepared by: Preethi Robins    Exercises  - Quadruped Hip Drops  - 5 x daily - 7 x weekly - 2 sets - 10 reps  - Supine 90/90 Sciatic Nerve Glide with Knee Flexion/Extension  - 1-2 x daily - 7 x weekly - 1 sets - 10 reps  - Supine Figure 4 Piriformis Stretch  - 1 x daily - 7 x weekly - 4 reps - 15 sec  hold  - Modified Cristhian Stretch  - 1 x daily - 7 x weekly - 3 reps - 20 sec  hold  - Clamshell with Resistance  - 1 x daily - 7 x weekly - 2 sets - 10 reps  - Supine Bridge  - 1 x daily - 7 x weekly - 1 sets - 10 reps

## 2024-08-12 ENCOUNTER — OFFICE VISIT (OUTPATIENT)
Dept: PHYSICAL THERAPY | Facility: CLINIC | Age: 54
End: 2024-08-12
Payer: COMMERCIAL

## 2024-08-12 DIAGNOSIS — M54.16 LEFT LUMBAR RADICULOPATHY: Primary | ICD-10-CM

## 2024-08-12 PROCEDURE — 97110 THERAPEUTIC EXERCISES: CPT

## 2024-08-12 PROCEDURE — 97112 NEUROMUSCULAR REEDUCATION: CPT

## 2024-08-12 NOTE — PROGRESS NOTES
"Daily Note     Today's date: 2024  Patient name: Shahrzad Espino  : 1970  MRN: [id number]  Referring provider: Birgit Bowling, PT  Dx:   Encounter Diagnosis     ICD-10-CM    1. Left lumbar radiculopathy  M54.16                      Subjective: patient reports she did trial the walk/jog again and noticed the QP did not decrease the burning in her glute post walk/jog       Objective: See treatment diary below      Assessment: Tolerated treatment well with traction deferred to trial tolerance without it. Patient to continue with walk/jog program with decreased total time to gauge if this improves tolerance. Did not note any deviations with walk/jog in clinic today. Patient would benefit from continued PT      Plan: Continue per plan of care.      Precautions:   Past Medical History:   Diagnosis Date    Anxiety     Insomnia     Migraines       Pancreas cyst      SOC: 2024  FOTO: 2024  POC Expiration:2024  Daily Treatment Log:  Date 2024     Visit # 6 7  8     Auth       Auth exp  9/15/2024 9/15/2024     Manual                        There Exer 15' 10'  10'     Clamshells  RTB 2x10 R/L GTB 2x10 R/L        Std hip abd  RTB @ knees 1x10 R/L  Steamboats RTB @ knees 1x10 R/L  Steamboats RTB @ knees 1x10 R/L      Std hip ext         Figure 4 stretch  20\"x3 R/L        Supine hip flexor stretch  20\" x3 R/L   20\"x3 R/L  20\"x3 R/L      Supine leg press   B/L 40# 2x10  B/L 40# 2x10     Walk/jog    3.5mph walk - 3 min and 4.4mph jog -2 min    Performed twice      HEP Updated/issued  Updated and discussed      There Activ                                                        NMReed 15' 30'  30'     Bridges  1x10  2x10       Prone press up  Qped hip drop 1x10    Qped hip drop 2x10       Sciatic nerve glide  Supine 2x10 R/L w/ sos   Supine w/ sos 1x15 R/L  Supine w/ sos 1x15 R/L      B/L shoulder ext w/ TrA   Red tubing 2x10  Red tubing 2x10      Paloff press   Dbl " red tubing 2x10 R/L  Dbl red tubing 2x10 R/L              Side stepping   RTB @ knees 15'x2  RTB @ knees 15'x2      Monster walks  RTB @ knees 2 laps near rail  RTB @ knees 2 laps near rail      [facility] assessment         Modalities 20'  15'       Mechanical lumbar traction  Static   Max:66#  Min 20#   Static   Max:66#  Min 25#                        HEP:   Access Code: [email or web address]  URL: [email or web address]  Date: 07/22/2024  Prepared by: Preethi Robins    Exercises  - Quadruped Hip Drops  - 5 x daily - 7 x weekly - 2 sets - 10 reps  - Supine 90/90 Sciatic Nerve Glide with Knee Flexion/Extension  - 1-2 x daily - 7 x weekly - 1 sets - 10 reps  - Supine Figure 4 Piriformis Stretch  - 1 x daily - 7 x weekly - 4 reps - 15 sec  hold  - Modified Cristhian Stretch  - 1 x daily - 7 x weekly - 3 reps - 20 sec  hold  - Clamshell with Resistance  - 1 x daily - 7 x weekly - 2 sets - 10 reps  - Supine Bridge  - 1 x daily - 7 x weekly - 1 sets - 10 reps

## 2024-08-19 ENCOUNTER — EVALUATION (OUTPATIENT)
Dept: PHYSICAL THERAPY | Facility: CLINIC | Age: 54
End: 2024-08-19
Payer: COMMERCIAL

## 2024-08-19 DIAGNOSIS — M54.16 LEFT LUMBAR RADICULOPATHY: Primary | ICD-10-CM

## 2024-08-19 PROCEDURE — 97110 THERAPEUTIC EXERCISES: CPT

## 2024-08-19 PROCEDURE — 97112 NEUROMUSCULAR REEDUCATION: CPT

## 2024-08-19 NOTE — PROGRESS NOTES
PT Re-Evaluation     Today's date: 2024  Patient name: Shahrzad Espino  : 1970  MRN: [id number]  Referring provider: Birgit Bowling PT  Dx:   Encounter Diagnosis     ICD-10-CM    1. Left lumbar radiculopathy  M54.16                        Assessment  Impairments: abnormal or restricted ROM, activity intolerance, impaired physical strength and pain with function  Symptom irritability: moderate    Assessment details: Shahrzad Espino is a 53 y.o. female who presents with improvements in pain, LE strength, hip ROM, ambulation, and postural awareness. Mid limitations still present with patient still limited in walk/jog program and higher level activities. Due to these impairments, patient has difficulty performing recreational activities, work-related activities, stair negotiation, and lifting/carrying. Mechanical assessment revealed signs of lumbar derangement with lumbar extension directional preference. Patient has been responding well to qped hip drops and use of traction in clinic. At home patient uses inversion table to simulate traction. Patient has been educated in home exercise program and plan of care. Patient would benefit from skilled physical therapy services to address their aforementioned functional limitations and progress towards prior level of function and independence with home exercise program.       Goals  Short term goals to be accomplished in 4 weeks:  STG 1: Pt will demo independence with postural management---Met  STG 2: Pt will demo I with HEP to maximize progress between therapy sessions ---Met  STG 3: Pt will demo L/S AROM < or = mod loss throughout to promote improved functional mobility and body mechanics ---Met  STG 4: Pt will demo 1/2 MMT grade in B/L Hips to improve stability with functional challenges---Met  STG 5: Pt will reports pain dec freq and intensity 50%---Met      Long term goals to be accomplished in 12 weeks:  LTG 1: Pt will demo good body mech with >75%  functional challenges to prevent reinjury---Met  LTG 2: Pt will be able to return to standing for 30 minutes pain free as per PLOF---Partially met  LTG 3: Pt will demo Good strength core stabilizers to promote carryover with body mech and posture---Met  LTG4: Pt will deny sleep disturbance secondary to pain ---Met  LTG5: Pt will demo L/S AROM < or = min loss throughout to promote improved functional mobility and body mechanics---Met      Plan  Patient would benefit from: PT eval and skilled physical therapy  Planned modality interventions: cryotherapy, thermotherapy: hydrocollator packs and traction    Planned therapy interventions: manual therapy, neuromuscular re-education, self care, therapeutic activities, therapeutic exercise, home exercise program, abdominal trunk stabilization, IASTM, joint mobilization, balance/weight bearing training, postural training, patient education, flexibility, strengthening and stretching    Frequency: 2x week  Plan of Care beginning date: 7/1/2024  Plan of Care expiration date: 10/31/2024  Treatment plan discussed with: patient  Plan details: HEP development, stretching, strengthening, A/AA/PROM, joint mobilizations, posture education, STM/MI as needed to reduce muscle tension, muscle reeducation, PLOC discussed and agreed upon with patient.            Subjective Evaluation    History of Present Illness  Mechanism of injury: Patient reports to PT for re-eval of acute exacerbation of chronic L sciatic pain. Patient has noted improvement in symptoms and with adjustment to 20 min on the walk/jog program she has improved tolerance compared to last session. Patient reports the pain has not been as bad with the occasional ache in the Low back and buttock. Patient would like to trial a 2 week hold to see how she does with HEP before potentially doing a formal D/C.   Patient Goals  Patient goals for therapy: decreased pain, increased strength, independence with ADLs/IADLs, increased  motion, return to sport/leisure activities and return to work    Pain  Current pain ratin  At best pain ratin  At worst pain ratin  Quality: burning and dull ache  Progression: improved    Social Support  Stairs in house: yes   Lives in: multiple-level home  Lives with: spouse    Employment status: working (self employed)    Diagnostic Tests  Abnormal CT scan: L5-S1 disc bulge.  Treatments  Current treatment: chiropractic        Objective    Posture: Lumbar lordosis is decreased in standing. There is no lateral shift.    In siting, lumbar roll does not change comfort.    Lumbar AROM limitations:  (*=  Pain)  Lumbar flexion: Min carmichael*(mild pain)   Lumbar extension: Min carmichael   R side glide:  Min carmichael  L side glide:  Min carmichael    Mechanical Asessment: has been responding well to Qped hip drops and traction in sessions. Reported no increase in pain with deferring traction last session.         Strength:  Core strength: Fair      Right  Left  Knee ext  5/5  5/5  Knee flex  4+/5  4+/5  Ankle DF  5/5  5/5  Ankle PF  5/5  5/5      Hip flexion(sit): 4+/5  4+/5  Hip abduction(sit) 5/5  5/5  Hip adduction(sit) 5/5  5/5      Tenderness/Palpation: mild TTP at L buttock     Flexibility:  Min carmichael in flexibility into ER and in quads and hip flexors     Function: inc pain with standing of 30 or more min of standing and with walk/jog program over 20 min           Precautions:   Past Medical History:   Diagnosis Date    Anxiety     Insomnia     Migraines     Pancreas cyst    SOC: 2024  FOTO: 2024  POC Expiration:10/31/2024  Daily Treatment Log:  Date 2024    Visit # 6 7  8(FOTO) 9 (RE)    Auth      Auth exp  9/15/2024 9/15/2024 9/15/2024    Manual                        There Exer 15' 10'  10' 10'    Clamshells  RTB 2x10 R/L GTB 2x10 R/L        Std hip abd  RTB @ knees 1x10 R/L  Steamboats RTB @ knees 1x10 R/L  Steamboats RTB @ knees 1x10 R/L      Std hip ext        "  Figure 4 stretch  20\"x3 R/L    20\" x3 R/L     Supine hip flexor stretch  20\" x3 R/L   20\"x3 R/L  20\"x3 R/L      Supine leg press   B/L 40# 2x10  B/L 40# 2x10 B/L 45# 2x10 (pillow under head for comfort)    Walk/jog    3.5mph walk - 3 min and 4.4mph jog -2 min    Performed twice      HEP Updated/issued  Updated and discussed  Pt edu on continuing with Hip drops, figure 4, and nerve glides as treatment but also preventively so that pain levels do not increase again.     There Activ                                                        NMReed 15' 30'  30' 30'    Bridges  1x10  2x10   In figure 4 position 1x10 R/L     Prone press up  Qped hip drop 1x10    Qped hip drop 2x10       Sciatic nerve glide  Supine 2x10 R/L w/ sos   Supine w/ sos 1x15 R/L  Supine w/ sos 1x15 R/L  Supine w/ sos 1x15 R/L     B/L shoulder ext w/ TrA   Red tubing 2x10  Red tubing 2x10  Grn tubing 2x10     Paloff press   Dbl red tubing 2x10 R/L  Dbl red tubing 2x10 R/L  Dbl red tubing 2x10 R/L             Side stepping   RTB @ knees 15'x2  RTB @ knees 15'x2  RTB @ knees 15'x2     Monster walks  RTB @ knees 2 laps near rail  RTB @ knees 2 laps near rail      [facility] assessment         Modalities 20'  15'       Mechanical lumbar traction  Static   Max:66#  Min 20#   Static   Max:66#  Min 25#                        HEP:   Access Code: [email or web address]  URL: [email or web address]  Date: 07/22/2024  Prepared by: Preethi Robins    Exercises  - Quadruped Hip Drops  - 5 x daily - 7 x weekly - 2 sets - 10 reps  - Supine 90/90 Sciatic Nerve Glide with Knee Flexion/Extension  - 1-2 x daily - 7 x weekly - 1 sets - 10 reps  - Supine Figure 4 Piriformis Stretch  - 1 x daily - 7 x weekly - 4 reps - 15 sec  hold  - Modified Cristhian Stretch  - 1 x daily - 7 x weekly - 3 reps - 20 sec  hold  - Clamshell with Resistance  - 1 x daily - 7 x weekly - 2 sets - 10 reps  - Supine Bridge  - 1 x daily - 7 x weekly - 1 sets - 10 reps         "

## 2024-09-09 DIAGNOSIS — F41.9 ANXIETY: ICD-10-CM

## 2024-09-09 DIAGNOSIS — E55.9 VITAMIN D DEFICIENCY: Primary | ICD-10-CM

## 2024-09-09 DIAGNOSIS — Z13.0 SCREENING FOR DEFICIENCY ANEMIA: ICD-10-CM

## 2024-09-09 DIAGNOSIS — Z13.29 SCREENING FOR THYROID DISORDER: ICD-10-CM

## 2024-09-09 DIAGNOSIS — Z13.1 SCREENING FOR DIABETES MELLITUS: ICD-10-CM

## 2024-09-09 DIAGNOSIS — G47.9 SLEEP DISTURBANCE: ICD-10-CM

## 2024-09-09 DIAGNOSIS — Z13.220 SCREENING, LIPID: ICD-10-CM

## 2024-09-09 RX ORDER — ZOLPIDEM TARTRATE 10 MG/1
10 TABLET ORAL
Qty: 60 TABLET | Refills: 0 | Status: SHIPPED | OUTPATIENT
Start: 2024-09-09

## 2024-09-09 RX ORDER — DIAZEPAM 5 MG
5 TABLET ORAL DAILY PRN
Qty: 90 TABLET | Refills: 0 | Status: SHIPPED | OUTPATIENT
Start: 2024-09-09

## 2024-10-03 LAB
25(OH)D3+25(OH)D2 SERPL-MCNC: 112 NG/ML (ref 30–100)
ALBUMIN SERPL-MCNC: 4.5 G/DL (ref 3.5–5.7)
ALP SERPL-CCNC: 109 U/L (ref 35–120)
ALT SERPL-CCNC: 10 U/L
ANION GAP SERPL CALCULATED.3IONS-SCNC: 9 MMOL/L (ref 3–11)
AST SERPL-CCNC: 15 U/L
BASOPHILS # BLD AUTO: 0.1 THOU/CMM (ref 0–0.1)
BASOPHILS NFR BLD AUTO: 1 %
BILIRUB SERPL-MCNC: 0.6 MG/DL (ref 0.2–1)
BUN SERPL-MCNC: 12 MG/DL (ref 7–25)
CALCIUM SERPL-MCNC: 9.6 MG/DL (ref 8.5–10.1)
CHLORIDE SERPL-SCNC: 104 MMOL/L (ref 100–109)
CHOLEST SERPL-MCNC: 185 MG/DL
CHOLEST/HDLC SERPL: 2.4 {RATIO}
CO2 SERPL-SCNC: 27 MMOL/L (ref 21–31)
CREAT SERPL-MCNC: 0.7 MG/DL (ref 0.4–1.1)
CYTOLOGY CMNT CVX/VAG CYTO-IMP: NORMAL
DIFFERENTIAL METHOD BLD: NORMAL
EOSINOPHIL # BLD AUTO: 0.1 THOU/CMM (ref 0–0.5)
EOSINOPHIL NFR BLD AUTO: 1 %
ERYTHROCYTE [DISTWIDTH] IN BLOOD BY AUTOMATED COUNT: 14 % (ref 12–16)
GFR/BSA.PRED SERPLBLD CYS-BASED-ARV: 101 ML/MIN/{1.73_M2}
GLUCOSE SERPL-MCNC: 93 MG/DL (ref 65–99)
HCT VFR BLD AUTO: 39 % (ref 35–43)
HDLC SERPL-MCNC: 76 MG/DL (ref 23–92)
HGB BLD-MCNC: 13 G/DL (ref 11.5–14.5)
LDLC SERPL CALC-MCNC: 100 MG/DL
LYMPHOCYTES # BLD AUTO: 1.5 THOU/CMM (ref 1–3)
LYMPHOCYTES NFR BLD AUTO: 28 %
MCH RBC QN AUTO: 31.8 PG (ref 26–34)
MCHC RBC AUTO-ENTMCNC: 33.2 G/DL (ref 32–37)
MCV RBC AUTO: 96 FL (ref 80–100)
MONOCYTES # BLD AUTO: 0.4 THOU/CMM (ref 0.3–1)
MONOCYTES NFR BLD AUTO: 8 %
NEUTROPHILS # BLD AUTO: 3.4 THOU/CMM (ref 1.8–7.8)
NEUTROPHILS NFR BLD AUTO: 62 %
NONHDLC SERPL-MCNC: 109 MG/DL
PLATELET # BLD AUTO: 253 THOU/CMM (ref 140–350)
PMV BLD REES-ECKER: 9 FL (ref 7.5–11.3)
POTASSIUM SERPL-SCNC: 3.9 MMOL/L (ref 3.5–5.2)
PROT SERPL-MCNC: 6.6 G/DL (ref 6.3–8.3)
RBC # BLD AUTO: 4.08 MILL/CMM (ref 3.7–4.7)
SODIUM SERPL-SCNC: 140 MMOL/L (ref 135–145)
TRIGL SERPL-MCNC: 43 MG/DL
TSH SERPL-ACNC: 1.5 UIU/ML (ref 0.45–5.33)
WBC # BLD AUTO: 5.5 THOU/CMM (ref 4–10)

## 2024-10-04 NOTE — RESULT ENCOUNTER NOTE
Antonio Markham  I reviewed your labs and all looked great, your vitamin d was slightly high so I would cut back on any vitamin d supplements that you may be taking. Have a great weekend  -Ivone

## 2024-10-16 ENCOUNTER — TELEPHONE (OUTPATIENT)
Age: 54
End: 2024-10-16

## 2024-10-16 DIAGNOSIS — N63.20 MASS OF LEFT BREAST, UNSPECIFIED QUADRANT: Primary | ICD-10-CM

## 2024-10-16 DIAGNOSIS — R92.8 ABNORMAL MAMMOGRAM: Primary | ICD-10-CM

## 2024-10-16 NOTE — TELEPHONE ENCOUNTER
[facility] called because PT called to make an appointment to get Mammo diagnostic left w cad (Order [id number]) done.  However, they said she just had a mammogram done in August, so its too soon to get another done. They advised for the provider to put in an order for an Ultra Sound of the Left Breast instead. Please advise.    Shahrzad: [phone]

## 2024-10-23 ENCOUNTER — TELEPHONE (OUTPATIENT)
Dept: INTERNAL MEDICINE CLINIC | Facility: CLINIC | Age: 54
End: 2024-10-23

## 2024-10-23 ENCOUNTER — HOSPITAL ENCOUNTER (OUTPATIENT)
Dept: ULTRASOUND IMAGING | Facility: CLINIC | Age: 54
Discharge: HOME | End: 2024-10-23
Payer: COMMERCIAL

## 2024-10-23 DIAGNOSIS — N63.20 MASS OF LEFT BREAST, UNSPECIFIED QUADRANT: ICD-10-CM

## 2024-10-23 PROCEDURE — 76642 ULTRASOUND BREAST LIMITED: CPT

## 2024-10-23 NOTE — TELEPHONE ENCOUNTER
----- Message from Ivone Angela PA-C sent at 10/23/2024  3:12 PM EDT -----  Ultrasound of breast shows that lump is a benign cyst, no intervention needed unless she would like to have a surgery consult for removal if it is bothering her

## 2024-10-24 ENCOUNTER — RA CDI HCC (OUTPATIENT)
Dept: OTHER | Facility: HOSPITAL | Age: 54
End: 2024-10-24

## 2024-10-24 PROBLEM — U07.1 COVID-19: Status: RESOLVED | Noted: 2022-09-19 | Resolved: 2024-10-24

## 2024-10-24 NOTE — PROGRESS NOTES
HCC coding opportunities       Chart reviewed, no opportunity found: CHART REVIEWED, NO OPPORTUNITY FOUND        Patients Insurance        Commercial Insurance: [employer]

## 2024-10-31 ENCOUNTER — OFFICE VISIT (OUTPATIENT)
Dept: INTERNAL MEDICINE CLINIC | Facility: CLINIC | Age: 54
End: 2024-10-31
Payer: COMMERCIAL

## 2024-10-31 VITALS
SYSTOLIC BLOOD PRESSURE: 118 MMHG | OXYGEN SATURATION: 99 % | HEIGHT: 64 IN | WEIGHT: 124 LBS | TEMPERATURE: 97.7 F | HEART RATE: 64 BPM | BODY MASS INDEX: 21.17 KG/M2 | DIASTOLIC BLOOD PRESSURE: 74 MMHG

## 2024-10-31 DIAGNOSIS — Z11.59 NEED FOR HEPATITIS C SCREENING TEST: ICD-10-CM

## 2024-10-31 DIAGNOSIS — Z00.00 WELL ADULT EXAM: Primary | ICD-10-CM

## 2024-10-31 DIAGNOSIS — Z23 ENCOUNTER FOR IMMUNIZATION: ICD-10-CM

## 2024-10-31 PROCEDURE — 99396 PREV VISIT EST AGE 40-64: CPT | Performed by: PHYSICIAN ASSISTANT

## 2024-10-31 NOTE — PROGRESS NOTES
Adult Annual Physical  Name: Shahrzad Espino      : 1970      MRN: [id number]  Encounter Provider: Ivone Angela PA-C  Encounter Date: 10/31/2024   Encounter department: [facility]    Assessment & Plan  Need for hepatitis C screening test    Orders:    Hepatitis C Antibody; Future    Encounter for immunization    Orders:    Zoster Vaccine Recombinant IM    influenza vaccine, recombinant, PF, 0.5 mL IM (Flublok)    Well adult exam         Immunizations and preventive care screenings were discussed with patient today. Appropriate education was printed on patient's after visit summary.    Counseling:  Labs, m ammogram and CRC screening current.       Depression Screening and Follow-up Plan: Patient was screened for depression during today's encounter. They screened negative with a PHQ-2 score of 0.        History of Present Illness     Adult Annual Physical:  Patient presents for annual physical.     Diet and Physical Activity:  - Diet/Nutrition: well balanced diet.  - Exercise: 3-4 times a week on average.    Depression Screening:  - PHQ-2 Score: 0    General Health:  - Sleep: sleeps well.  - Hearing: normal hearing bilateral ears.  - Vision: goes for regular eye exams.  - Dental: regular dental visits.    /GYN Health:  - Follows with GYN: yes.     Advanced Care Planning:  - Has an advanced directive?: no    - Has a durable medical POA?: no    - ACP document given to patient?: no      Review of Systems   Constitutional:  Negative for chills and fever.   HENT:  Negative for congestion, ear pain, hearing loss, postnasal drip, rhinorrhea, sinus pressure, sinus pain, sore throat and trouble swallowing.    Eyes:  Negative for pain and visual disturbance.   Respiratory:  Negative for cough, chest tightness, shortness of breath and wheezing.    Cardiovascular: Negative.  Negative for chest pain, palpitations and leg swelling.   Gastrointestinal:  Negative for abdominal pain, blood in  stool, constipation, diarrhea, nausea and vomiting.   Endocrine: Negative for cold intolerance, heat intolerance, polydipsia, polyphagia and polyuria.   Genitourinary:  Negative for difficulty urinating, dysuria, flank pain and urgency.   Musculoskeletal:  Negative for arthralgias, back pain, gait problem and myalgias.   Skin:  Negative for rash.   Allergic/Immunologic: Negative.    Neurological:  Negative for dizziness, weakness, light-headedness and headaches.   Hematological: Negative.    Psychiatric/Behavioral:  Negative for behavioral problems, dysphoric mood and sleep disturbance. The patient is not nervous/anxious.      Current Outpatient Medications on File Prior to Visit   Medication Sig Dispense Refill    cyclobenzaprine (FLEXERIL) 10 mg tablet Take 1 tablet (10 mg total) by mouth 3 (three) times a day 90 tablet 1    diazepam (VALIUM) 5 mg tablet Take 1 tablet (5 mg total) by mouth daily as needed for anxiety 90 tablet 0    diphenhydrAMINE (BENADRYL) 25 mg capsule Take 25 mg by mouth every 6 (six) hours as needed for itching      fluticasone (FLONASE) 50 mcg/act nasal spray 2 sprays into each nostril daily      hydrocortisone (WESTCORT) 0.2 % cream       ketoconazole (NIZORAL) 2 % shampoo as needed      Lotemax SM 0.38 % GEL Administer 1 drop to both eyes 3 (three) times a day      mometasone (ELOCON) 0.1 % lotion       naproxen (Naprosyn) 500 mg tablet Take 1 tablet (500 mg total) by mouth 2 (two) times a day with meals 60 tablet 1    rizatriptan (MAXALT) 10 mg tablet TAKE 1 TABLET BY MOUTH 1 TIME AS NEEDED FOR MIGRAINE FOR UP TO 1  DOSE 90 tablet 1    triamcinolone (KENALOG) 0.1 % cream as needed      zolpidem (AMBIEN) 10 mg tablet Take 1 tablet (10 mg total) by mouth daily at bedtime as needed for sleep 60 tablet 0     No current facility-administered medications on file prior to visit.      Social History     Tobacco Use    Smoking status: Former     Passive exposure: Past    Smokeless tobacco: Never  "   Tobacco comments:     quit 22 years ago    Vaping Use    Vaping status: Never Used   Substance and Sexual Activity    Alcohol use: Not Currently     Comment: rare    Drug use: Never    Sexual activity: Not Currently     Partners: Male     Birth control/protection: I.U.D.       Objective     /74   Pulse 64   Temp 97.7 °F (36.5 °C) (Tympanic)   Ht 5' 4\" (1.626 m)   Wt 56.2 kg (124 lb)   LMP 02/24/2012   SpO2 99%   BMI 21.28 kg/m²     Physical Exam  Vitals and nursing note reviewed.   Constitutional:       General: She is not in acute distress.     Appearance: Normal appearance. She is well-developed. She is not diaphoretic.   HENT:      Head: Normocephalic and atraumatic.      Right Ear: External ear normal.      Left Ear: External ear normal.      Nose: Nose normal.      Mouth/Throat:      Pharynx: No oropharyngeal exudate.   Eyes:      General: No scleral icterus.        Right eye: No discharge.         Left eye: No discharge.      Conjunctiva/sclera: Conjunctivae normal.      Pupils: Pupils are equal, round, and reactive to light.   Neck:      Thyroid: No thyromegaly.   Cardiovascular:      Rate and Rhythm: Normal rate and regular rhythm.      Heart sounds: Normal heart sounds. No murmur heard.     No friction rub. No gallop.   Pulmonary:      Effort: Pulmonary effort is normal. No respiratory distress.      Breath sounds: Normal breath sounds. No wheezing or rales.   Abdominal:      General: Bowel sounds are normal. There is no distension.      Palpations: Abdomen is soft.      Tenderness: There is no abdominal tenderness.   Musculoskeletal:         General: No tenderness or deformity. Normal range of motion.      Cervical back: Normal range of motion and neck supple.   Skin:     General: Skin is warm and dry.   Neurological:      Mental Status: She is alert and oriented to person, place, and time.      Cranial Nerves: No cranial nerve deficit.   Psychiatric:         Behavior: Behavior normal.    "      Thought Content: Thought content normal.         Judgment: Judgment normal.       Administrative Statements   I have spent a total time of 15 minutes in caring for this patient on the day of the visit/encounter including Instructions for management, Patient and family education, Importance of tx compliance, Risk factor reductions, Impressions, Counseling / Coordination of care, Documenting in the medical record, Reviewing / ordering tests, medicine, procedures  , and Obtaining or reviewing history  .

## 2024-11-13 DIAGNOSIS — R01.1 CARDIAC MURMUR: Primary | ICD-10-CM

## 2025-01-29 ENCOUNTER — TELEPHONE (OUTPATIENT)
Dept: GASTROENTEROLOGY | Facility: CLINIC | Age: 55
End: 2025-01-29

## 2025-04-22 DIAGNOSIS — M54.32 SCIATICA OF LEFT SIDE: ICD-10-CM

## 2025-04-23 RX ORDER — NAPROXEN 500 MG/1
500 TABLET ORAL 2 TIMES DAILY WITH MEALS
Qty: 60 TABLET | Refills: 1 | Status: SHIPPED | OUTPATIENT
Start: 2025-04-23

## 2025-07-07 DIAGNOSIS — M54.2 NECK PAIN: ICD-10-CM

## 2025-07-07 DIAGNOSIS — M54.32 SCIATICA OF LEFT SIDE: ICD-10-CM

## 2025-07-07 RX ORDER — NAPROXEN 500 MG/1
500 TABLET ORAL 2 TIMES DAILY WITH MEALS
Qty: 180 TABLET | Refills: 1 | Status: SHIPPED | OUTPATIENT
Start: 2025-07-07

## 2025-07-07 RX ORDER — CYCLOBENZAPRINE HCL 10 MG
10 TABLET ORAL 3 TIMES DAILY
Qty: 270 TABLET | Refills: 1 | Status: SHIPPED | OUTPATIENT
Start: 2025-07-07

## (undated) DEVICE — ENDOSCOPIC ULTRASOUND ASPIRATION NEEDLE: Brand: EXPECT SLIMLINE SL